# Patient Record
Sex: FEMALE | Race: BLACK OR AFRICAN AMERICAN | Employment: FULL TIME | URBAN - NONMETROPOLITAN AREA
[De-identification: names, ages, dates, MRNs, and addresses within clinical notes are randomized per-mention and may not be internally consistent; named-entity substitution may affect disease eponyms.]

---

## 2017-10-12 ENCOUNTER — HOSPITAL ENCOUNTER (EMERGENCY)
Age: 42
Discharge: HOME | End: 2017-10-12
Payer: COMMERCIAL

## 2017-10-12 VITALS
OXYGEN SATURATION: 99 % | DIASTOLIC BLOOD PRESSURE: 76 MMHG | RESPIRATION RATE: 16 BRPM | BODY MASS INDEX: 25.77 KG/M2 | WEIGHT: 180 LBS | TEMPERATURE: 98.4 F | HEIGHT: 70 IN | SYSTOLIC BLOOD PRESSURE: 117 MMHG | HEART RATE: 93 BPM

## 2017-10-12 DIAGNOSIS — Z88.9 HISTORY OF SEASONAL ALLERGIES: ICD-10-CM

## 2017-10-12 DIAGNOSIS — A08.4 VIRAL GASTROENTERITIS: Primary | ICD-10-CM

## 2017-10-12 PROCEDURE — 99213 OFFICE O/P EST LOW 20 MIN: CPT

## 2017-10-12 PROCEDURE — 99213 OFFICE O/P EST LOW 20 MIN: CPT | Performed by: NURSE PRACTITIONER

## 2017-10-12 RX ORDER — CETIRIZINE HYDROCHLORIDE 10 MG/1
10 TABLET ORAL DAILY
Qty: 30 TABLET | Refills: 0 | COMMUNITY
Start: 2017-10-12 | End: 2020-02-10 | Stop reason: ALTCHOICE

## 2017-10-12 RX ORDER — LABETALOL 100 MG/1
100 TABLET, FILM COATED ORAL 2 TIMES DAILY
COMMUNITY
End: 2020-02-10 | Stop reason: ALTCHOICE

## 2017-10-12 ASSESSMENT — ENCOUNTER SYMPTOMS
ABDOMINAL DISTENTION: 0
EYE REDNESS: 0
EYE DISCHARGE: 0
VOICE CHANGE: 0
BLOOD IN STOOL: 0
COUGH: 0
SORE THROAT: 0
SWOLLEN GLANDS: 0
VOMITING: 1
SINUS PRESSURE: 0
STRIDOR: 0
DIARRHEA: 1
ABDOMINAL PAIN: 0
NAUSEA: 1
TROUBLE SWALLOWING: 0
RHINORRHEA: 1
WHEEZING: 0

## 2017-10-12 ASSESSMENT — PAIN DESCRIPTION - PAIN TYPE: TYPE: ACUTE PAIN

## 2017-10-12 ASSESSMENT — PAIN SCALES - GENERAL: PAINLEVEL_OUTOF10: 7

## 2017-10-12 NOTE — ED PROVIDER NOTES
[facility] Fall [facility]  Urgent Care Encounter      CHIEF COMPLAINT       Chief Complaint   Patient presents with    Nausea    Generalized Body Aches       Nurses Notes reviewed and I agree except as noted in the HPI. HISTORY OF PRESENT ILLNESS   Juice Williamson is a 38 y.o. female who presents:  Also reports has been sneezing for a couple weeks has history of seasonal allergies. Has zyrtec at home has not taken it yet. No nasal nasal congestion, no cough, no fever, no sore throat. The history is provided by the patient. Illness    The current episode started yesterday. The onset was sudden. The problem occurs frequently. The problem has been unchanged. The problem is moderate. The symptoms are relieved by one or more OTC medications. Nothing aggravates the symptoms. Associated symptoms include diarrhea, nausea, vomiting, rhinorrhea and muscle aches. Pertinent negatives include no fever, no abdominal pain, no congestion, no ear pain, no headaches, no sore throat, no stridor, no swollen glands, no cough, no URI, no wheezing, no rash, no eye discharge and no eye redness. The rhinorrhea has been occurring rarely. The nasal discharge has a clear appearance. The vomiting occurs intermittently (yesterday). The vomiting is not associated with pain. Diarrhea timin times watery brown last 24 hours, last one last evening. She has been eating less than usual and drinking less than usual (ate waffles yesterday morning). Urine output has been normal. The last void occurred less than 6 hours ago. Sick contacts: sick kids  She has received no recent medical care. REVIEW OF SYSTEMS     Review of Systems   Constitutional: Positive for appetite change. Negative for activity change, chills, diaphoresis, fatigue, fever and unexpected weight change. HENT: Positive for rhinorrhea and sneezing.  Negative for congestion, ear pain, postnasal drip, sinus pressure, sore throat, trouble swallowing and voice oropharynx is clear and moist and mucous membranes are normal.   Neck: Normal range of motion. Neck supple. Cardiovascular: Normal rate, regular rhythm, S1 normal, S2 normal and normal heart sounds. No murmur heard. Pulmonary/Chest: Effort normal and breath sounds normal. No accessory muscle usage. No respiratory distress. She has no decreased breath sounds. She has no wheezes. She has no rhonchi. She has no rales. Abdominal: Soft. Bowel sounds are normal. She exhibits no distension. There is no tenderness. Lymphadenopathy:     She has no cervical adenopathy. Neurological: She is alert and oriented to person, place, and time. Skin: Skin is warm, dry and intact. No rash noted. She is not diaphoretic. No pallor. Psychiatric: She has a normal mood and affect. Nursing note and vitals reviewed. DIAGNOSTIC RESULTS   Labs:No results found for this visit on 10/12/17. IMAGING:    URGENT CARE COURSE:     Vitals:    10/12/17 0813   BP: 117/76   Pulse: 93   Resp: 16   Temp: 98.4 °F (36.9 °C)   SpO2: 99%   Weight: 180 lb (81.6 kg)   Height: 5' 10\" (1.778 m)       Medications - No data to display  PROCEDURES:  None  FINAL IMPRESSION      1. Viral gastroenteritis    2. History of seasonal allergies        DISPOSITION/PLAN   DISPOSITION Decision to Discharge   Had 2 emesis yesterday none today, no diarrhea stools. No abdominal tenderness. Afebrile, nontoxic  In appearance. Offered Zofran states gives her a headache. These symptoms are consistent with viral gastroenteritis. I recommend Clear Liquids only next 12-24 hours. If tolerated then BRAT Diet .   Advise the patient that if worsening symptoms such as more than 6 stools per day, not voiding regularly, persistent vomiting not relieved with medication,unable to take oral fluids, high fever, severe weakness, lightheadedness or fainting, dry mucous membranes or other signs of dehydration, persisting or increasing abdominal pain, blood in stool or

## 2019-02-07 ENCOUNTER — HOSPITAL ENCOUNTER (OUTPATIENT)
Age: 44
Setting detail: SPECIMEN
Discharge: HOME | End: 2019-02-07
Payer: COMMERCIAL

## 2019-02-07 LAB
ABSOLUTE EOS #: 0.21 K/UL (ref 0–0.44)
ABSOLUTE IMMATURE GRANULOCYTE: <0.03 K/UL (ref 0–0.3)
ABSOLUTE LYMPH #: 2.38 K/UL (ref 1.1–3.7)
ABSOLUTE MONO #: 0.5 K/UL (ref 0.1–1.2)
ALBUMIN SERPL-MCNC: 4.9 G/DL (ref 3.5–5.2)
ALBUMIN/GLOBULIN RATIO: 2.5 (ref 1–2.5)
ALP BLD-CCNC: 69 U/L (ref 35–104)
ALT SERPL-CCNC: 14 U/L (ref 5–33)
ANION GAP SERPL CALCULATED.3IONS-SCNC: 15 MMOL/L (ref 9–17)
AST SERPL-CCNC: 17 U/L
BASOPHILS # BLD: 0 % (ref 0–2)
BASOPHILS ABSOLUTE: 0.03 K/UL (ref 0–0.2)
BILIRUB SERPL-MCNC: 1.43 MG/DL (ref 0.3–1.2)
BUN BLDV-MCNC: 12 MG/DL (ref 6–20)
BUN/CREAT BLD: ABNORMAL (ref 9–20)
CALCIUM SERPL-MCNC: 9.6 MG/DL (ref 8.6–10.4)
CHLORIDE BLD-SCNC: 103 MMOL/L (ref 98–107)
CHOLESTEROL/HDL RATIO: 2.3
CHOLESTEROL: 181 MG/DL
CO2: 23 MMOL/L (ref 20–31)
CREAT SERPL-MCNC: 0.64 MG/DL (ref 0.5–0.9)
DIFFERENTIAL TYPE: NORMAL
EOSINOPHILS RELATIVE PERCENT: 3 % (ref 1–4)
GFR AFRICAN AMERICAN: >60 ML/MIN
GFR NON-AFRICAN AMERICAN: >60 ML/MIN
GFR SERPL CREATININE-BSD FRML MDRD: ABNORMAL ML/MIN/{1.73_M2}
GFR SERPL CREATININE-BSD FRML MDRD: ABNORMAL ML/MIN/{1.73_M2}
GLUCOSE BLD-MCNC: 72 MG/DL (ref 70–99)
HCT VFR BLD CALC: 40 % (ref 36.3–47.1)
HDLC SERPL-MCNC: 80 MG/DL
HEMOGLOBIN: 13.1 G/DL (ref 11.9–15.1)
IMMATURE GRANULOCYTES: 0 %
LDL CHOLESTEROL: 90 MG/DL (ref 0–130)
LYMPHOCYTES # BLD: 35 % (ref 24–43)
MCH RBC QN AUTO: 29.2 PG (ref 25.2–33.5)
MCHC RBC AUTO-ENTMCNC: 32.8 G/DL (ref 28.4–34.8)
MCV RBC AUTO: 89.3 FL (ref 82.6–102.9)
MONOCYTES # BLD: 7 % (ref 3–12)
NRBC AUTOMATED: 0 PER 100 WBC
PDW BLD-RTO: 12.3 % (ref 11.8–14.4)
PLATELET # BLD: NORMAL K/UL (ref 138–453)
PLATELET ESTIMATE: NORMAL
PLATELET, FLUORESCENCE: 177 K/UL (ref 138–453)
PLATELET, IMMATURE FRACTION: 11.6 % (ref 1.1–10.3)
PMV BLD AUTO: NORMAL FL (ref 8.1–13.5)
POTASSIUM SERPL-SCNC: 4.1 MMOL/L (ref 3.7–5.3)
RBC # BLD: 4.48 M/UL (ref 3.95–5.11)
RBC # BLD: NORMAL 10*6/UL
SEG NEUTROPHILS: 54 % (ref 36–65)
SEGMENTED NEUTROPHILS ABSOLUTE COUNT: 3.6 K/UL (ref 1.5–8.1)
SODIUM BLD-SCNC: 141 MMOL/L (ref 135–144)
TOTAL PROTEIN: 6.9 G/DL (ref 6.4–8.3)
TRIGL SERPL-MCNC: 53 MG/DL
TSH SERPL DL<=0.05 MIU/L-ACNC: 1.16 MIU/L (ref 0.3–5)
VLDLC SERPL CALC-MCNC: NORMAL MG/DL (ref 1–30)
WBC # BLD: 6.7 K/UL (ref 3.5–11.3)
WBC # BLD: NORMAL 10*3/UL

## 2019-03-01 ENCOUNTER — HOSPITAL ENCOUNTER (OUTPATIENT)
Age: 44
Setting detail: SPECIMEN
Discharge: HOME | End: 2019-03-01
Payer: COMMERCIAL

## 2019-03-01 LAB — PLATELET # BLD: 189 K/UL (ref 138–453)

## 2020-02-10 ENCOUNTER — OFFICE VISIT (OUTPATIENT)
Dept: FAMILY MEDICINE CLINIC | Age: 45
End: 2020-02-10
Payer: COMMERCIAL

## 2020-02-10 VITALS
HEART RATE: 86 BPM | OXYGEN SATURATION: 98 % | SYSTOLIC BLOOD PRESSURE: 132 MMHG | HEIGHT: 70 IN | RESPIRATION RATE: 12 BRPM | BODY MASS INDEX: 24.97 KG/M2 | WEIGHT: 174.4 LBS | DIASTOLIC BLOOD PRESSURE: 86 MMHG | TEMPERATURE: 98.7 F

## 2020-02-10 PROCEDURE — 99203 OFFICE O/P NEW LOW 30 MIN: CPT | Performed by: FAMILY MEDICINE

## 2020-02-10 RX ORDER — AMLODIPINE BESYLATE 2.5 MG/1
2.5 TABLET ORAL DAILY
Qty: 90 TABLET | Refills: 3 | Status: SHIPPED | OUTPATIENT
Start: 2020-02-10 | End: 2021-02-15

## 2020-02-10 RX ORDER — AMLODIPINE BESYLATE 2.5 MG/1
TABLET ORAL DAILY
COMMUNITY
Start: 2019-12-04 | End: 2020-02-10 | Stop reason: SDUPTHER

## 2020-02-10 RX ORDER — AMOXICILLIN 875 MG/1
875 TABLET, COATED ORAL 2 TIMES DAILY
Qty: 20 TABLET | Refills: 0 | Status: SHIPPED | OUTPATIENT
Start: 2020-02-10 | End: 2020-02-20

## 2020-02-10 ASSESSMENT — ENCOUNTER SYMPTOMS
COUGH: 0
SINUS PRESSURE: 1
CONSTIPATION: 0
ABDOMINAL PAIN: 0
SHORTNESS OF BREATH: 0
DIARRHEA: 0
EYE PAIN: 1
SINUS PAIN: 1
NAUSEA: 0
BACK PAIN: 0
VOMITING: 0
SORE THROAT: 0
RHINORRHEA: 0

## 2020-02-10 ASSESSMENT — PATIENT HEALTH QUESTIONNAIRE - PHQ9
SUM OF ALL RESPONSES TO PHQ QUESTIONS 1-9: 0
2. FEELING DOWN, DEPRESSED OR HOPELESS: 0
1. LITTLE INTEREST OR PLEASURE IN DOING THINGS: 0
SUM OF ALL RESPONSES TO PHQ QUESTIONS 1-9: 0
SUM OF ALL RESPONSES TO PHQ9 QUESTIONS 1 & 2: 0

## 2020-02-10 NOTE — PROGRESS NOTES
History   Problem Relation Age of Onset    High Blood Pressure Mother     High Blood Pressure Father     Diabetes Father     Heart Surgery Father      Social History     Tobacco Use    Smoking status: Never Smoker    Smokeless tobacco: Never Used   Substance Use Topics    Alcohol use: No      Current Outpatient Medications   Medication Sig Dispense Refill    amLODIPine (NORVASC) 2.5 MG tablet daily       No current facility-administered medications for this visit. Allergies   Allergen Reactions    Adhesive Tape Other (See Comments)    Morphine Itching and Nausea And Vomiting    Sulfa Antibiotics Rash       Health Maintenance   Topic Date Due    DTaP/Tdap/Td vaccine (1 - Tdap) 04/11/1988    HIV screen  04/11/1992    Cervical cancer screen  04/11/1998    Flu vaccine (1) 09/01/2019    Lipid screen  02/07/2024    Shingles Vaccine (1 of 2) 04/11/2027    Hepatitis A vaccine  Aged Out    Hepatitis B vaccine  Aged Out    Hib vaccine  Aged Out    Meningococcal (ACWY) vaccine  Aged Out    Pneumococcal 0-64 years Vaccine  Aged Out       Subjective:      Review of Systems   Constitutional: Negative for chills and fever. HENT: Positive for sinus pressure and sinus pain. Negative for ear discharge, ear pain and sore throat. Eyes: Positive for pain. Negative for visual disturbance. Respiratory: Negative for cough and shortness of breath. Cardiovascular: Negative for chest pain and palpitations. Gastrointestinal: Negative for abdominal pain, constipation, diarrhea, nausea and vomiting. Genitourinary: Negative for difficulty urinating and dysuria. Musculoskeletal: Negative for arthralgias and back pain. Neurological: Positive for headaches. Negative for syncope. Psychiatric/Behavioral: Negative for dysphoric mood. The patient is not nervous/anxious.           Objective:     Vitals:    02/10/20 1003   BP: 132/86   Site: Left Upper Arm   Position: Sitting   Cuff Size: Medium Adult   Pulse: LABGLOM >60 02/07/2019    CALCIUM 9.6 02/07/2019       Imaging Results:    No results found. Assessment:     1. Hypertension    2. Sinus infection      Plan:     1. Continue amlodipine to control blood pressure. Educated patient on correctly taking blood pressure including seated position for at least 5 minutes, arm at heart level, refraining from caffeine or any coffee before hand and smoking as well. 2. Sinus infection: will start amoxicillin 10 days, 2x a day, to control infection. 3. Draw labs including CBC, CMP, TSH, and lipid panel    4. Will follow up within 1 month to evaluate blood pressure readings and go over lab work. Will make this a wellness visit to help out with work forms/requirements. 5. Recommended doing physical activity and taking multivitamin. No follow-ups on file. Orders Placed:  No orders of the defined types were placed in this encounter. Medications Prescribed:  No orders of the defined types were placed in this encounter. No future appointments. Patient given educational materials - see patient instructions. Discussed use, benefit, and sideeffects of prescribed medications. All patient questions answered. Pt voiced understanding. Reviewed health maintenance. Instructed to continue current medications, diet and exercise. Patient agreed with treatment plan. Follow up as directed. I was present for the key portions of the exam and history and confirmed all areas of the note with the patient, staff and the student.       Electronically signed by Lam Zimmer on 2/10/2020 at 10:14 AM

## 2020-02-10 NOTE — PROGRESS NOTES
2/10/2020    Rogelio Vaca (:  1975) is a 45 y.o. female, here for evaluation of the following medical concerns:    Htn:  Running high at home. 130-140 over 80-90. Taking amlodipine. Frontal sinus pressure going on for several weeks. 2 wks of increased sputum production. Review of Systems   Constitutional: Negative for chills and fever. HENT: Positive for sinus pressure. Negative for ear pain and rhinorrhea. Respiratory: Negative for cough and shortness of breath. Cardiovascular: Negative for chest pain and palpitations. Gastrointestinal: Negative for diarrhea, nausea and vomiting. Endocrine: Negative for cold intolerance and polyuria. Genitourinary: Negative for dysuria and frequency. Musculoskeletal: Negative for arthralgias and back pain. Skin: Negative for rash and wound. Neurological: Negative for weakness and numbness. Hematological: Negative for adenopathy. Does not bruise/bleed easily. Psychiatric/Behavioral: Negative for sleep disturbance. The patient is not nervous/anxious. Prior to Visit Medications    Medication Sig Taking?  Authorizing Provider   amLODIPine (NORVASC) 2.5 MG tablet daily Yes Historical Provider, MD   amoxicillin (AMOXIL) 875 MG tablet Take 1 tablet by mouth 2 times daily for 10 days Yes Rheba Shires, DO        Allergies   Allergen Reactions    Adhesive Tape Other (See Comments)    Morphine Itching and Nausea And Vomiting    Sulfa Antibiotics Rash       Past Medical History:   Diagnosis Date    Hypertension        Past Surgical History:   Procedure Laterality Date    APPENDECTOMY      Dr. Mio Burris Right 2014    HYSTERECTOMY  2012    LIPOMA RESECTION Left 13    POSTERIOR THORACIC MASS-Dr. Gaby Ballesteros    MYOMECTOMY  , , , 2006    OVARY REMOVAL      right-Dr. Ana Ohara     SMALL INTESTINE SURGERY      intestine stuck to abdominal wall Dr. Duarte Bryant 2015 and 07/2017       Social History     Socioeconomic History    Marital status: Single     Spouse name: Not on file    Number of children: Not on file    Years of education: Not on file    Highest education level: Not on file   Occupational History    Not on file   Social Needs    Financial resource strain: Not on file    Food insecurity:     Worry: Not on file     Inability: Not on file    Transportation needs:     Medical: Not on file     Non-medical: Not on file   Tobacco Use    Smoking status: Never Smoker    Smokeless tobacco: Never Used   Substance and Sexual Activity    Alcohol use: No    Drug use: No    Sexual activity: Not on file   Lifestyle    Physical activity:     Days per week: Not on file     Minutes per session: Not on file    Stress: Not on file   Relationships    Social connections:     Talks on phone: Not on file     Gets together: Not on file     Attends Anabaptism service: Not on file     Active member of club or organization: Not on file     Attends meetings of clubs or organizations: Not on file     Relationship status: Not on file    Intimate partner violence:     Fear of current or ex partner: Not on file     Emotionally abused: Not on file     Physically abused: Not on file     Forced sexual activity: Not on file   Other Topics Concern    Not on file   Social History Narrative    Not on file        Family History   Problem Relation Age of Onset    High Blood Pressure Mother     High Blood Pressure Father     Diabetes Father     Heart Surgery Father        Vitals:    02/10/20 1003   BP: 132/86   Site: Left Upper Arm   Position: Sitting   Cuff Size: Medium Adult   Pulse: 86   Resp: 12   Temp: 98.7 °F (37.1 °C)   TempSrc: Oral   SpO2: 98%   Weight: 174 lb 6.4 oz (79.1 kg)   Height: 5' 10\" (1.778 m)     Estimated body mass index is 25.02 kg/m² as calculated from the following:    Height as of this encounter: 5' 10\" (1.778 m).     Weight as of this encounter: 174 lb 6.4 oz (79.1 kg). Physical Exam  Constitutional:       Appearance: She is well-developed. HENT:      Head: Normocephalic and atraumatic. Right Ear: External ear normal.      Left Ear: External ear normal.      Nose: Nose normal.   Eyes:      Pupils: Pupils are equal, round, and reactive to light. Neck:      Musculoskeletal: Normal range of motion and neck supple. Cardiovascular:      Rate and Rhythm: Normal rate and regular rhythm. Heart sounds: Normal heart sounds. Pulmonary:      Effort: Pulmonary effort is normal.      Breath sounds: Normal breath sounds. Abdominal:      General: There is no distension. Palpations: Abdomen is soft. Tenderness: There is no abdominal tenderness. Musculoskeletal: Normal range of motion. Skin:     General: Skin is warm and dry. Neurological:      Mental Status: She is alert and oriented to person, place, and time. Psychiatric:         Behavior: Behavior normal.         Thought Content: Thought content normal.         ASSESSMENT/PLAN:  1. Essential hypertension  Monitor at home. Cont amlodipine 2.5mg daily. F/u 1 month with readings and make changes if necessary    2. Sinus infection: acute. Start amoxil 875mg bid x 10 days. Return in about 4 weeks (around 3/9/2020). An  electronic signature was used to authenticate this note.     --Nathaniel Ríos, DO on 2/10/2020 at 11:02 AM

## 2020-02-24 ENCOUNTER — NURSE ONLY (OUTPATIENT)
Dept: LAB | Age: 45
End: 2020-02-24

## 2020-02-24 LAB
ALBUMIN SERPL-MCNC: 5 G/DL (ref 3.5–5.1)
ALP BLD-CCNC: 78 U/L (ref 38–126)
ALT SERPL-CCNC: 13 U/L (ref 11–66)
ANION GAP SERPL CALCULATED.3IONS-SCNC: 11 MEQ/L (ref 8–16)
AST SERPL-CCNC: 18 U/L (ref 5–40)
BASOPHILS # BLD: 0.4 %
BASOPHILS ABSOLUTE: 0 THOU/MM3 (ref 0–0.1)
BILIRUB SERPL-MCNC: 0.9 MG/DL (ref 0.3–1.2)
BUN BLDV-MCNC: 11 MG/DL (ref 7–22)
CALCIUM SERPL-MCNC: 9.5 MG/DL (ref 8.5–10.5)
CHLORIDE BLD-SCNC: 104 MEQ/L (ref 98–111)
CHOLESTEROL, TOTAL: 156 MG/DL (ref 100–199)
CO2: 25 MEQ/L (ref 23–33)
CREAT SERPL-MCNC: 0.8 MG/DL (ref 0.4–1.2)
EOSINOPHIL # BLD: 2.8 %
EOSINOPHILS ABSOLUTE: 0.2 THOU/MM3 (ref 0–0.4)
ERYTHROCYTE [DISTWIDTH] IN BLOOD BY AUTOMATED COUNT: 12 % (ref 11.5–14.5)
ERYTHROCYTE [DISTWIDTH] IN BLOOD BY AUTOMATED COUNT: 39.9 FL (ref 35–45)
GFR SERPL CREATININE-BSD FRML MDRD: 78 ML/MIN/1.73M2
GLUCOSE BLD-MCNC: 92 MG/DL (ref 70–108)
HCT VFR BLD CALC: 42.4 % (ref 37–47)
HDLC SERPL-MCNC: 72 MG/DL
HEMOGLOBIN: 13.9 GM/DL (ref 12–16)
IMMATURE GRANS (ABS): 0.01 THOU/MM3 (ref 0–0.07)
IMMATURE GRANULOCYTES: 0.1 %
LDL CHOLESTEROL CALCULATED: 68 MG/DL
LYMPHOCYTES # BLD: 38.5 %
LYMPHOCYTES ABSOLUTE: 2.8 THOU/MM3 (ref 1–4.8)
MCH RBC QN AUTO: 30 PG (ref 26–33)
MCHC RBC AUTO-ENTMCNC: 32.8 GM/DL (ref 32.2–35.5)
MCV RBC AUTO: 91.4 FL (ref 81–99)
MONOCYTES # BLD: 6.9 %
MONOCYTES ABSOLUTE: 0.5 THOU/MM3 (ref 0.4–1.3)
NUCLEATED RED BLOOD CELLS: 0 /100 WBC
PLATELET # BLD: 224 THOU/MM3 (ref 130–400)
PMV BLD AUTO: 12.6 FL (ref 9.4–12.4)
POTASSIUM SERPL-SCNC: 4.3 MEQ/L (ref 3.5–5.2)
RBC # BLD: 4.64 MILL/MM3 (ref 4.2–5.4)
SEG NEUTROPHILS: 51.3 %
SEGMENTED NEUTROPHILS ABSOLUTE COUNT: 3.7 THOU/MM3 (ref 1.8–7.7)
SODIUM BLD-SCNC: 140 MEQ/L (ref 135–145)
TOTAL PROTEIN: 7.7 G/DL (ref 6.1–8)
TRIGL SERPL-MCNC: 80 MG/DL (ref 0–199)
TSH SERPL DL<=0.05 MIU/L-ACNC: 2.06 UIU/ML (ref 0.4–4.2)
WBC # BLD: 7.3 THOU/MM3 (ref 4.8–10.8)

## 2020-03-12 ENCOUNTER — OFFICE VISIT (OUTPATIENT)
Dept: FAMILY MEDICINE CLINIC | Age: 45
End: 2020-03-12
Payer: COMMERCIAL

## 2020-03-12 VITALS
WEIGHT: 174 LBS | HEART RATE: 78 BPM | OXYGEN SATURATION: 98 % | HEIGHT: 70 IN | DIASTOLIC BLOOD PRESSURE: 83 MMHG | BODY MASS INDEX: 24.91 KG/M2 | SYSTOLIC BLOOD PRESSURE: 120 MMHG

## 2020-03-12 PROCEDURE — 99396 PREV VISIT EST AGE 40-64: CPT | Performed by: FAMILY MEDICINE

## 2020-03-12 NOTE — PROGRESS NOTES
Family Medicine Clinic Visit    Vivi Williamson presents for   Chief Complaint   Patient presents with    Hypertension    Annual Exam       HPI: Patient has hypertension. Readings have been averaging in the 120s over 80s at home. Occasionally she has high diastolics but mostly controlled. She is taking amlodipine 2.5 mg p.o. daily. No other significant issues. She reports that she is doing well otherwise. ROS:  denies CP, SOB, N/V/D    Past Medical History:   Diagnosis Date    Hypertension      Allergies   Allergen Reactions    Adhesive Tape Other (See Comments)    Morphine Itching and Nausea And Vomiting    Sulfa Antibiotics Rash       Current Outpatient Medications   Medication Sig Dispense Refill    amLODIPine (NORVASC) 2.5 MG tablet Take 1 tablet by mouth daily 90 tablet 3     No current facility-administered medications for this visit. Physical exam:  Vitals:    03/12/20 0914   BP: 120/83   Pulse: 78   SpO2: 98%     Physical Exam  Constitutional:       Appearance: She is well-developed. HENT:      Head: Normocephalic and atraumatic. Right Ear: External ear normal.      Left Ear: External ear normal.      Nose: Nose normal.   Eyes:      Pupils: Pupils are equal, round, and reactive to light. Neck:      Musculoskeletal: Normal range of motion and neck supple. Cardiovascular:      Rate and Rhythm: Normal rate and regular rhythm. Heart sounds: Normal heart sounds. Pulmonary:      Effort: Pulmonary effort is normal.      Breath sounds: Normal breath sounds. Abdominal:      General: There is no distension. Palpations: Abdomen is soft. Tenderness: There is no abdominal tenderness. Musculoskeletal: Normal range of motion. Skin:     General: Skin is warm and dry. Neurological:      Mental Status: She is alert and oriented to person, place, and time. Psychiatric:         Behavior: Behavior normal.         Thought Content:  Thought content normal.

## 2020-06-26 ENCOUNTER — TELEPHONE (OUTPATIENT)
Dept: FAMILY MEDICINE CLINIC | Age: 45
End: 2020-06-26

## 2020-06-26 NOTE — TELEPHONE ENCOUNTER
After speaking with Dr. Kaden Barrera advised patient to complete e-visit. Patient voiced understanding and denied any other questions or concerns at this time.

## 2020-07-09 ENCOUNTER — OFFICE VISIT (OUTPATIENT)
Dept: FAMILY MEDICINE CLINIC | Age: 45
End: 2020-07-09
Payer: COMMERCIAL

## 2020-07-09 VITALS
OXYGEN SATURATION: 98 % | DIASTOLIC BLOOD PRESSURE: 88 MMHG | BODY MASS INDEX: 27.16 KG/M2 | HEIGHT: 69 IN | SYSTOLIC BLOOD PRESSURE: 124 MMHG | WEIGHT: 183.4 LBS | TEMPERATURE: 97.3 F | HEART RATE: 90 BPM

## 2020-07-09 PROCEDURE — 99214 OFFICE O/P EST MOD 30 MIN: CPT | Performed by: FAMILY MEDICINE

## 2020-07-09 RX ORDER — AMOXICILLIN 875 MG/1
875 TABLET, COATED ORAL 2 TIMES DAILY
Qty: 20 TABLET | Refills: 0 | Status: SHIPPED | OUTPATIENT
Start: 2020-07-09 | End: 2020-07-19

## 2020-07-09 SDOH — ECONOMIC STABILITY: TRANSPORTATION INSECURITY
IN THE PAST 12 MONTHS, HAS THE LACK OF TRANSPORTATION KEPT YOU FROM MEDICAL APPOINTMENTS OR FROM GETTING MEDICATIONS?: NO

## 2020-07-09 SDOH — ECONOMIC STABILITY: TRANSPORTATION INSECURITY
IN THE PAST 12 MONTHS, HAS LACK OF TRANSPORTATION KEPT YOU FROM MEETINGS, WORK, OR FROM GETTING THINGS NEEDED FOR DAILY LIVING?: NO

## 2020-07-09 SDOH — ECONOMIC STABILITY: FOOD INSECURITY: WITHIN THE PAST 12 MONTHS, THE FOOD YOU BOUGHT JUST DIDN'T LAST AND YOU DIDN'T HAVE MONEY TO GET MORE.: NEVER TRUE

## 2020-07-09 SDOH — ECONOMIC STABILITY: FOOD INSECURITY: WITHIN THE PAST 12 MONTHS, YOU WORRIED THAT YOUR FOOD WOULD RUN OUT BEFORE YOU GOT MONEY TO BUY MORE.: NEVER TRUE

## 2020-07-09 SDOH — ECONOMIC STABILITY: INCOME INSECURITY: HOW HARD IS IT FOR YOU TO PAY FOR THE VERY BASICS LIKE FOOD, HOUSING, MEDICAL CARE, AND HEATING?: NOT HARD AT ALL

## 2020-07-09 NOTE — PROGRESS NOTES
Health Maintenance Due   Topic Date Due    HIV screen  04/11/1992ordered    Cervical cancer screen  04/11/199806/21/20DR. Davie James
days         Health Maintenance Due   Topic Date Due    HIV screen  04/11/1992    Cervical cancer screen  04/11/1998       Patient Instructions     Thank you   1. Thank you for trusting us with your healthcare needs. You may receive a survey regarding today's visit. It would help us out if you would take a few moments to provide your feedback. We value your input. 2. Please bring in ALL medications BOTTLES, including inhalers, herbal supplements, over the counter, prescribed & non-prescribed medicine. The office would like actual medication bottles and a list.   3. Please note our OFFICE POLICIES:   a. Prior to getting your labs drawn, please check with your insurance company for benefits and eligibility of lab services. Often, insurance companies cover certain tests for preventative visits only. It is patient's responsibility to see what is covered. b. We are unable to change a diagnosis after the test has been performed. c. Lab orders will not be re-printed. Please hold onto your original lab orders and take them to your lab to be completed. d. If you no show your scheduled appointment three times, you will be dismissed from this practice. e. Martene Gables must be completed 24 hours prior to your schedule appointment. 4. If the list below has been completed, PLEASE FAX RECORDS TO OUR OFFICE @ [phone]. Once the records have been received we will update your records at our office:  Health Maintenance Due   Topic Date Due    HIV screen  04/11/1992    Cervical cancer screen  04/11/1998               Return in about 6 months (around 1/9/2021) for chronic diease follow-up.       Nate Recinos

## 2020-07-09 NOTE — PATIENT INSTRUCTIONS
Thank you   1. Thank you for trusting us with your healthcare needs. You may receive a survey regarding today's visit. It would help us out if you would take a few moments to provide your feedback. We value your input. 2. Please bring in ALL medications BOTTLES, including inhalers, herbal supplements, over the counter, prescribed & non-prescribed medicine. The office would like actual medication bottles and a list.   3. Please note our OFFICE POLICIES:   a. Prior to getting your labs drawn, please check with your insurance company for benefits and eligibility of lab services. Often, insurance companies cover certain tests for preventative visits only. It is patient's responsibility to see what is covered. b. We are unable to change a diagnosis after the test has been performed. c. Lab orders will not be re-printed. Please hold onto your original lab orders and take them to your lab to be completed. d. If you no show your scheduled appointment three times, you will be dismissed from this practice. e. Brennan Blare must be completed 24 hours prior to your schedule appointment. 4. If the list below has been completed, PLEASE FAX RECORDS TO OUR OFFICE @ [phone].  Once the records have been received we will update your records at our office:  Health Maintenance Due   Topic Date Due    HIV screen  04/11/1992    Cervical cancer screen  04/11/1998

## 2020-07-10 ENCOUNTER — PATIENT MESSAGE (OUTPATIENT)
Dept: FAMILY MEDICINE CLINIC | Age: 45
End: 2020-07-10

## 2020-07-10 NOTE — LETTER
[address] [place] [address],[address] [facility] [address] [place] [id number]  Phone: [phone]  Fax: [phone]    Mukesh Dawn DO        July 10, 2020     Patient: Nick Lee   YOB: 1975   Date of Visit: 7/10/2020       To Whom it May Concern:    Sudhakar Blackwell is under my care. Please allow her to use a face shield instead of a face mask at work due due to medical concerns. If you have any questions or concerns, please don't hesitate to call.     Sincerely,         Mukesh Dawn DO

## 2020-07-14 ENCOUNTER — TELEPHONE (OUTPATIENT)
Dept: FAMILY MEDICINE CLINIC | Age: 45
End: 2020-07-14

## 2020-07-14 NOTE — TELEPHONE ENCOUNTER
Letter written with diagnosis code on letter per [facility] [employer]. Letter has been faxed.

## 2020-07-14 NOTE — TELEPHONE ENCOUNTER
Leonora HAWKINS at [facility] in [facility] called stating that patient has brought in a letter stating that she will need to wear a face shield during work. Leonora states that the letter needs to have a medical diagnosis on the letter. Which the letter will need faxed to [phone] within 3 days or patient will no longer be able to wear the face mask during work.

## 2020-11-16 ENCOUNTER — OFFICE VISIT (OUTPATIENT)
Dept: FAMILY MEDICINE CLINIC | Age: 45
End: 2020-11-16
Payer: COMMERCIAL

## 2020-11-16 VITALS
OXYGEN SATURATION: 99 % | HEIGHT: 69 IN | WEIGHT: 187.8 LBS | DIASTOLIC BLOOD PRESSURE: 90 MMHG | HEART RATE: 73 BPM | TEMPERATURE: 97 F | SYSTOLIC BLOOD PRESSURE: 126 MMHG | RESPIRATION RATE: 16 BRPM | BODY MASS INDEX: 27.81 KG/M2

## 2020-11-16 PROCEDURE — 99212 OFFICE O/P EST SF 10 MIN: CPT | Performed by: FAMILY MEDICINE

## 2020-11-16 RX ORDER — CETIRIZINE HYDROCHLORIDE 10 MG/1
10 TABLET ORAL DAILY PRN
COMMUNITY

## 2020-11-16 NOTE — PROGRESS NOTES
Family Medicine Clinic Visit    Toy Alexander presents for   Chief Complaint   Patient presents with    Mass     bumps in back of throat dentist noticed in September and suggested for patient to PCP       HPI: Patient's dentist noted some masses in the back of her throat on the right side and wanted to get them checked out. No issues including fevers chills. She does not know if they have been growing. No problems with swallowing. ROS:  denies CP, SOB, N/V/D    Past Medical History:   Diagnosis Date    Hypertension      Allergies   Allergen Reactions    Adhesive Tape Other (See Comments)     Blisters    Morphine Itching and Nausea And Vomiting     Headache    Sulfa Antibiotics Rash       Current Outpatient Medications   Medication Sig Dispense Refill    cetirizine (ZYRTEC) 10 MG tablet Take 10 mg by mouth daily      amLODIPine (NORVASC) 2.5 MG tablet Take 1 tablet by mouth daily 90 tablet 3    Vitamin Mixture (VITAMIN C) LIQD Take by mouth daily      Multiple Vitamin (MULTIVITAMINS PO) Take by mouth daily liquid       No current facility-administered medications for this visit. Physical exam:  Vitals:    11/16/20 1701   BP: (!) 126/90   Pulse:    Resp:    Temp:    SpO2:      2 small 4 to 5 mm fleshy nodules are noted appear to be extensions of the adenoids dripping behind the tonsillar pillar on the right side. Appear to be pink healthy tissue. Cecelia Butler was seen today for mass. Diagnoses and all orders for this visit:    Mass of throat-recheck at her next well visit in about 2 to 3 months. She declines going to the ENT. Health Maintenance Due   Topic Date Due    HIV screen  04/11/1992    Cervical cancer screen  04/11/1998    Flu vaccine (1) 09/01/2020       Patient Instructions     Thank you   1. Thank you for trusting us with your healthcare needs. You may receive a survey regarding today's visit.  It would help us out if you would take a few moments to provide your feedback. We value your input. 2. Please bring in ALL medications BOTTLES, including inhalers, herbal supplements, over the counter, prescribed & non-prescribed medicine. The office would like actual medication bottles and a list.   3. Please note our OFFICE POLICIES:   a. Prior to getting your labs drawn, please check with your insurance company for benefits and eligibility of lab services. Often, insurance companies cover certain tests for preventative visits only. It is patient's responsibility to see what is covered. b. We are unable to change a diagnosis after the test has been performed. c. Lab orders will not be re-printed. Please hold onto your original lab orders and take them to your lab to be completed. d. If you no show your scheduled appointment three times, you will be dismissed from this practice. e. Becerril Valarie must be completed 24 hours prior to your schedule appointment. 4. If the list below has been completed, PLEASE FAX RECORDS TO OUR OFFICE @ [phone]. Once the records have been received we will update your records at our office:  Health Maintenance Due   Topic Date Due    HIV screen  04/11/1992    Cervical cancer screen  04/11/1998    Flu vaccine (1) 09/01/2020               Return in about 3 months (around 2/16/2021).       Francine Haney

## 2020-11-16 NOTE — PATIENT INSTRUCTIONS
Thank you   1. Thank you for trusting us with your healthcare needs. You may receive a survey regarding today's visit. It would help us out if you would take a few moments to provide your feedback. We value your input. 2. Please bring in ALL medications BOTTLES, including inhalers, herbal supplements, over the counter, prescribed & non-prescribed medicine. The office would like actual medication bottles and a list.   3. Please note our OFFICE POLICIES:   a. Prior to getting your labs drawn, please check with your insurance company for benefits and eligibility of lab services. Often, insurance companies cover certain tests for preventative visits only. It is patient's responsibility to see what is covered. b. We are unable to change a diagnosis after the test has been performed. c. Lab orders will not be re-printed. Please hold onto your original lab orders and take them to your lab to be completed. d. If you no show your scheduled appointment three times, you will be dismissed from this practice. e. Elvira Pink must be completed 24 hours prior to your schedule appointment. 4. If the list below has been completed, PLEASE FAX RECORDS TO OUR OFFICE @ [phone].  Once the records have been received we will update your records at our office:  Health Maintenance Due   Topic Date Due    HIV screen  04/11/1992    Cervical cancer screen  04/11/1998    Flu vaccine (1) 09/01/2020

## 2021-02-15 RX ORDER — AMLODIPINE BESYLATE 2.5 MG/1
TABLET ORAL
Qty: 90 TABLET | Refills: 3 | Status: SHIPPED | OUTPATIENT
Start: 2021-02-15 | End: 2022-04-19

## 2021-02-15 NOTE — TELEPHONE ENCOUNTER
Last visit- 11/16/2020  Next visit- 2/22/2021 with Dr. Gretel Mcqueen    Requested Prescriptions     Pending Prescriptions Disp Refills    amLODIPine (NORVASC) 2.5 MG tablet [Pharmacy Med Name: AMLODIPINE BESYLATE 2.5 MG TAB] 90 tablet 3     Sig: TAKE 1 Lysle Yelena       Dr. Charlie Eckert is out of the office, sending to Dr. Aminta Celestin whom is covering    Please review, approve or deny

## 2021-02-26 ENCOUNTER — TELEPHONE (OUTPATIENT)
Dept: FAMILY MEDICINE CLINIC | Age: 46
End: 2021-02-26

## 2021-02-26 NOTE — TELEPHONE ENCOUNTER
Angelica Marixa called in regarding a VM she received about a No Show appt on 2.22.2021. Pt states when the automated call came through to confirm her appt, she chose to cancel. Auto system confirmed the appt was cancelled and someone from the office would reach out to r/s with her.       [phone]

## 2021-03-01 NOTE — TELEPHONE ENCOUNTER
Spoke with pt, pt states she is dealing with her father and all his doctors appointments and that she will call the office to schedule after she gets her father taken care of. Is there anyway to fix her \"NO SHOW\" to a cancelled on 2/22/2021?

## 2021-04-05 ENCOUNTER — NURSE ONLY (OUTPATIENT)
Dept: LAB | Age: 46
End: 2021-04-05

## 2021-04-05 DIAGNOSIS — Z00.00 ANNUAL PHYSICAL EXAM: ICD-10-CM

## 2021-04-05 DIAGNOSIS — Z11.4 SCREENING FOR HIV (HUMAN IMMUNODEFICIENCY VIRUS): ICD-10-CM

## 2021-04-05 LAB
ALBUMIN SERPL-MCNC: 5.2 G/DL (ref 3.5–5.1)
ALP BLD-CCNC: 76 U/L (ref 38–126)
ALT SERPL-CCNC: 27 U/L (ref 11–66)
ANION GAP SERPL CALCULATED.3IONS-SCNC: 10 MEQ/L (ref 8–16)
AST SERPL-CCNC: 27 U/L (ref 5–40)
BILIRUB SERPL-MCNC: 1.2 MG/DL (ref 0.3–1.2)
BUN BLDV-MCNC: 13 MG/DL (ref 7–22)
CALCIUM SERPL-MCNC: 9.7 MG/DL (ref 8.5–10.5)
CHLORIDE BLD-SCNC: 107 MEQ/L (ref 98–111)
CHOLESTEROL, TOTAL: 185 MG/DL (ref 100–199)
CO2: 28 MEQ/L (ref 23–33)
CREAT SERPL-MCNC: 0.7 MG/DL (ref 0.4–1.2)
GFR SERPL CREATININE-BSD FRML MDRD: > 90 ML/MIN/1.73M2
GLUCOSE BLD-MCNC: 92 MG/DL (ref 70–108)
HDLC SERPL-MCNC: 90 MG/DL
LDL CHOLESTEROL CALCULATED: 83 MG/DL
POTASSIUM SERPL-SCNC: 4.1 MEQ/L (ref 3.5–5.2)
SODIUM BLD-SCNC: 145 MEQ/L (ref 135–145)
TOTAL PROTEIN: 7.9 G/DL (ref 6.1–8)
TRIGL SERPL-MCNC: 62 MG/DL (ref 0–199)

## 2021-04-06 LAB — HIV AG/AB: NONREACTIVE

## 2021-04-19 ENCOUNTER — TELEPHONE (OUTPATIENT)
Dept: FAMILY MEDICINE CLINIC | Age: 46
End: 2021-04-19

## 2021-04-19 ENCOUNTER — OFFICE VISIT (OUTPATIENT)
Dept: FAMILY MEDICINE CLINIC | Age: 46
End: 2021-04-19
Payer: COMMERCIAL

## 2021-04-19 VITALS
HEART RATE: 81 BPM | DIASTOLIC BLOOD PRESSURE: 64 MMHG | TEMPERATURE: 98.6 F | BODY MASS INDEX: 27.17 KG/M2 | WEIGHT: 184 LBS | OXYGEN SATURATION: 98 % | SYSTOLIC BLOOD PRESSURE: 118 MMHG | RESPIRATION RATE: 16 BRPM

## 2021-04-19 DIAGNOSIS — Z12.31 ENCOUNTER FOR SCREENING MAMMOGRAM FOR MALIGNANT NEOPLASM OF BREAST: ICD-10-CM

## 2021-04-19 DIAGNOSIS — I10 ESSENTIAL HYPERTENSION: ICD-10-CM

## 2021-04-19 DIAGNOSIS — Z00.00 ANNUAL PHYSICAL EXAM: Primary | ICD-10-CM

## 2021-04-19 PROCEDURE — 99396 PREV VISIT EST AGE 40-64: CPT | Performed by: FAMILY MEDICINE

## 2021-04-19 RX ORDER — IBUPROFEN 800 MG/1
TABLET ORAL
COMMUNITY
Start: 2021-04-14 | End: 2021-09-01

## 2021-04-19 ASSESSMENT — PATIENT HEALTH QUESTIONNAIRE - PHQ9
SUM OF ALL RESPONSES TO PHQ QUESTIONS 1-9: 1
SUM OF ALL RESPONSES TO PHQ QUESTIONS 1-9: 1

## 2021-04-19 NOTE — PROGRESS NOTES
Health Maintenance Due   Topic Date Due    Hepatitis C screen  Never done    COVID-19 Vaccine (1) Never done    Cervical cancer screen  Never done 2020 - Annette Corea
place, and time. Psychiatric:         Behavior: Behavior normal.         Thought Content: Thought content normal.       Vitals:    04/19/21 1405   BP: 118/64   Pulse: 81   Resp: 16   Temp: 98.6 °F (37 °C)   SpO2: 98%           An electronic signature was used to authenticate this note.     --Josh Puri, DO

## 2021-04-19 NOTE — TELEPHONE ENCOUNTER
Health Maintenance Due   Topic Date Due    Hepatitis C screen  Never done    COVID-19 Vaccine (1) Never done    Cervical cancer screen  Never done 2020 - Annette Desire Lonell Sham       Please call for pap results.  [phone]

## 2021-09-01 ENCOUNTER — OFFICE VISIT (OUTPATIENT)
Dept: FAMILY MEDICINE CLINIC | Age: 46
End: 2021-09-01
Payer: COMMERCIAL

## 2021-09-01 VITALS
RESPIRATION RATE: 16 BRPM | SYSTOLIC BLOOD PRESSURE: 120 MMHG | DIASTOLIC BLOOD PRESSURE: 84 MMHG | OXYGEN SATURATION: 98 % | HEART RATE: 89 BPM | WEIGHT: 194.4 LBS | HEIGHT: 69 IN | BODY MASS INDEX: 28.79 KG/M2

## 2021-09-01 DIAGNOSIS — S93.412D SPRAIN OF CALCANEOFIBULAR LIGAMENT OF LEFT ANKLE, SUBSEQUENT ENCOUNTER: Primary | ICD-10-CM

## 2021-09-01 DIAGNOSIS — R26.89 UNABLE TO BEAR WEIGHT ON LEFT LOWER EXTREMITY: ICD-10-CM

## 2021-09-01 DIAGNOSIS — M62.838 MUSCLE SPASM: ICD-10-CM

## 2021-09-01 PROBLEM — I10 ESSENTIAL HYPERTENSION: Status: ACTIVE | Noted: 2021-09-01

## 2021-09-01 PROCEDURE — 99213 OFFICE O/P EST LOW 20 MIN: CPT | Performed by: STUDENT IN AN ORGANIZED HEALTH CARE EDUCATION/TRAINING PROGRAM

## 2021-09-01 RX ORDER — CYCLOBENZAPRINE HCL 10 MG
10 TABLET ORAL 3 TIMES DAILY PRN
Qty: 30 TABLET | Refills: 0 | Status: SHIPPED | OUTPATIENT
Start: 2021-09-01 | End: 2021-09-13 | Stop reason: SDUPTHER

## 2021-09-01 RX ORDER — OXYCODONE HYDROCHLORIDE 5 MG/1
5 CAPSULE ORAL EVERY 4 HOURS PRN
COMMUNITY
End: 2021-09-23 | Stop reason: ALTCHOICE

## 2021-09-01 SDOH — ECONOMIC STABILITY: FOOD INSECURITY: WITHIN THE PAST 12 MONTHS, YOU WORRIED THAT YOUR FOOD WOULD RUN OUT BEFORE YOU GOT MONEY TO BUY MORE.: PATIENT DECLINED

## 2021-09-01 SDOH — ECONOMIC STABILITY: FOOD INSECURITY: WITHIN THE PAST 12 MONTHS, THE FOOD YOU BOUGHT JUST DIDN'T LAST AND YOU DIDN'T HAVE MONEY TO GET MORE.: PATIENT DECLINED

## 2021-09-01 ASSESSMENT — SOCIAL DETERMINANTS OF HEALTH (SDOH): HOW HARD IS IT FOR YOU TO PAY FOR THE VERY BASICS LIKE FOOD, HOUSING, MEDICAL CARE, AND HEATING?: PATIENT DECLINED

## 2021-09-01 NOTE — PROGRESS NOTES
Health Maintenance Due   Topic Date Due    Hepatitis C screen  Never done    Cervical cancer screen  Never done    Flu vaccine (1) 09/01/2021

## 2021-09-01 NOTE — PROGRESS NOTES
02/07/2019     Lab Results   Component Value Date    HDL 90 04/05/2021    HDL 72 02/24/2020    HDL 80 02/07/2019     Lab Results   Component Value Date    LDLCHOLESTEROL 90 02/07/2019    LDLCALC 83 04/05/2021    LDLCALC 68 02/24/2020       No results found for: Orene Alpers    Review of Systems    Physical Exam  Vitals and nursing note reviewed. Constitutional:       General: She is not in acute distress (in pain, sitting to right side, in wheel chair). Appearance: She is well-developed. She is not diaphoretic. HENT:      Head: Normocephalic and atraumatic. Right Ear: External ear normal.      Left Ear: External ear normal.      Nose: Nose normal.   Eyes:      General: No scleral icterus. Right eye: No discharge. Left eye: No discharge. Conjunctiva/sclera: Conjunctivae normal.   Cardiovascular:      Pulses: Normal pulses. Pulmonary:      Effort: Pulmonary effort is normal.   Musculoskeletal:         General: Swelling (mild left ankle) and tenderness (inferior to lateral maleolus, tender over medial tibial plateau, tender over proximal fibula ) present. No deformity. Cervical back: Normal range of motion. Skin:     General: Skin is warm and dry. Findings: No erythema or rash. Neurological:      Mental Status: She is alert and oriented to person, place, and time. Psychiatric:         Behavior: Behavior normal.         Thought Content:  Thought content normal.         Judgment: Judgment normal.         Immunization History   Administered Date(s) Administered    COVID-19, J&J, PF, 0.5 mL 03/31/2021    Hepatitis A Ped/Adol (Havrix, Vaqta) 08/08/2016, 02/27/2017    Hepatitis B 08/08/2016, 09/07/2016, 02/27/2017    Influenza, High Dose (Fluzone 65 yrs and older) 11/01/2017    Tdap (Boostrix, Adacel) 08/08/2016       Health Maintenance Due   Topic Date Due    Hepatitis C screen  Never done    Cervical cancer screen  Never done    Flu vaccine (1) 09/01/2021 Food Insecurity: Unknown    Worried About Running Out of Food in the Last Year: Patient refused    Ran Out of Food in the Last Year: Patient refused       Assessment / Plan:      Diagnosis Orders   1. Sprain of calcaneofibular ligament of left ankle, subsequent encounter  XR TIBIA FIBULA LEFT (2 VIEWS)    [facility] -  [facility]    XR ANKLE LEFT (MIN 3 VIEWS)   2. Muscle spasm  cyclobenzaprine (FLEXERIL) 10 MG tablet   3. Unable to bear weight on left lower extremity  XR ANKLE LEFT (MIN 3 VIEWS)    XR KNEE LEFT (3 VIEWS)     Recheck XR ankle, check tib-fib xray due to proximal fibula pain. Check XR knee due to inability to bear weight and pain over medial tibial plateau. Exercises for at home. Refer to PT. Flexeril 10 mg TID PRN   Okay for NSAIDs with food for pain   Discontinue walking boot, bear weight as tolerated, ambulate with crutches as needed   RICE   Follow up in 4-6 weeks, consider OMT if continued pain for potential fibular head dysfunction if xrays normal        Return in about 6 weeks (around 10/13/2021) for ankle pain . Medications Prescribed:  Orders Placed This Encounter   Medications    cyclobenzaprine (FLEXERIL) 10 MG tablet     Sig: Take 1 tablet by mouth 3 times daily as needed for Muscle spasms     Dispense:  30 tablet     Refill:  0       No future appointments. Patient given educational materials - see patient instructions. Discussed use, benefit, and sideeffects of prescribed medications. All patient questions answered. Pt voiced understanding. Reviewed health maintenance. Instructed to continue current medications, diet and exercise. Patient agreed with treatment plan. Follow up as directed.      Electronically signed by Justo Kelly DO on 9/1/2021 at 4:08 PM

## 2021-09-01 NOTE — PATIENT INSTRUCTIONS
Patient Education        Ankle Sprain: Care Instructions  Your Care Instructions     An ankle sprain can happen when you twist your ankle. The ligaments that support the ankle can get stretched and torn. Often the ankle is swollen and painful. Ankle sprains may take from several weeks to several months to heal. Usually, the more pain and swelling you have, the more severe your ankle sprain is and the longer it will take to heal. You can heal faster and regain strength in your ankle with good home treatment. It is very important to give your ankle time to heal completely, so that you do not easily hurt your ankle again. Follow-up care is a key part of your treatment and safety. Be sure to make and go to all appointments, and call your doctor if you are having problems. It's also a good idea to know your test results and keep a list of the medicines you take. How can you care for yourself at home? · Prop up your foot on pillows as much as possible for the next 3 days. Try to keep your ankle above the level of your heart. This will help reduce the swelling. · Follow your doctor's directions for wearing a splint or elastic bandage. Wrapping the ankle may help reduce or prevent swelling. · Your doctor may give you a splint, a brace, an air stirrup, or another form of ankle support to protect your ankle until it is healed. Wear it as directed while your ankle is healing. Do not remove it unless your doctor tells you to. After your ankle has healed, ask your doctor whether you should wear the brace when you exercise. · Put ice or cold packs on your injured ankle for 10 to 20 minutes at a time. Try to do this every 1 to 2 hours for the next 3 days (when you are awake) or until the swelling goes down. Put a thin cloth between the ice and your skin. · You may need to use crutches until you can walk without pain. If you do use crutches, try to bear some weight on your injured ankle if you can do so without pain.  This helps the ankle heal.  · Take pain medicines exactly as directed. ? If the doctor gave you a prescription medicine for pain, take it as prescribed. ? If you are not taking a prescription pain medicine, ask your doctor if you can take an over-the-counter medicine. · If you have been given ankle exercises to do at home, do them exactly as instructed. These can promote healing and help prevent lasting weakness. When should you call for help? Call your doctor now or seek immediate medical care if:    · Your pain is getting worse.     · Your swelling is getting worse.     · Your splint feels too tight or you are unable to loosen it. Watch closely for changes in your health, and be sure to contact your doctor if:    · You are not getting better after 1 week. Where can you learn more? Go to https://LawnStarter.TakeLessons. org and sign in to your [employer] account. Enter D233 in the [employer]OLOG[employer] box to learn more about \"Ankle Sprain: Care Instructions. \"     If you do not have an account, please click on the \"Sign Up Now\" link. Current as of: November 16, 2020               Content Version: 12.9  © 2006-2021 [employer]. Care instructions adapted under license by [facility] [address] If you have questions about a medical condition or this instruction, always ask your healthcare professional. [address] any warranty or liability for your use of this information. Patient Education        Ankle Sprain: Rehab Exercises  Introduction  Here are some examples of exercises for you to try. The exercises may be suggested for a condition or for rehabilitation. Start each exercise slowly. Ease off the exercises if you start to have pain. You will be told when to start these exercises and which ones will work best for you. How to do the exercises  'Alphabet' exercise   1. Trace the alphabet with your toe. This helps your ankle move in all directions.     Side-to-side knee swing exercise   1. Sit in a chair with your foot flat on the floor. 2. Slowly move your knee from side to side. Keep your foot pressed flat. 3. Continue this exercise for 2 to 3 minutes. Towel curl   1. While sitting, place your foot on a towel on the floor. Scrunch the towel toward you with your toes. 2. Then use your toes to push the towel away from you. 3. To make this exercise more challenging you can put something on the other end of the towel. A can of soup is about the right weight for this. Towel stretch   1. Sit with your legs extended and knees straight. 2. Place a towel around your foot just under the toes. 3. Hold each end of the towel in each hand, with your hands above your knees. 4. Pull back with the towel so that your foot stretches toward you. 5. Hold the position for at least 15 to 30 seconds. 6. Repeat 2 to 4 times a session. Do up to 5 sessions a day. Ankle eversion exercise   1. Start by sitting with your foot flat on the floor. Push your foot outward against a wall or a piece of furniture that doesn't move. Hold for about 6 seconds, and relax. Repeat 8 to 12 times. 2. After you feel comfortable with this, try using rubber tubing looped around the outside of your feet for resistance. Push your foot out to the side against the tubing, and then count to 10 as you slowly bring your foot back to the middle. Repeat 8 to 12 times. Isometric opposition exercises   1. While sitting, put your feet together flat on the floor. 2. Press your injured foot inward against your other foot. Hold for about 6 seconds, and relax. Repeat 8 to 12 times. 3. Then place the heel of your other foot on top of the injured one. Push down with the top heel while trying to push up with your injured foot. Hold for about 6 seconds, and relax. Repeat 8 to 12 times. Resisted ankle inversion   1. Sit on the floor with your good leg crossed over your other leg.   2. Hold both ends of an exercise band and loop the band around the inside of your affected foot. Then press your other foot against the band. 3. Keeping your legs crossed, slowly push your affected foot against the band so that foot moves away from your other foot. Then slowly relax. 4. Repeat 8 to 12 times. Resisted ankle eversion   1. Sit on the floor with your legs straight. 2. Hold both ends of an exercise band and loop the band around the outside of your affected foot. Then press your other foot against the band. 3. Keeping your leg straight, slowly push your affected foot outward against the band and away from your other foot without letting your leg rotate. Then slowly relax. 4. Repeat 8 to 12 times. Resisted ankle dorsiflexion   1. Tie the ends of an exercise band together to form a loop. Attach one end of the loop to a secure object or shut a door on it to hold it in place. (Or you can have someone hold one end of the loop to provide resistance.)  2. While sitting on the floor or in a chair, loop the other end of the band over the top of your affected foot. 3. Keeping your knee and leg straight, slowly flex your foot to pull back on the exercise band, and then slowly relax. 4. Repeat 8 to 12 times. Single-leg balance   1. Stand on a flat surface with your arms stretched out to your sides like you are making the letter \"T. \" Then lift your good leg off the floor, bending it at the knee. If you are not steady on your feet, use one hand to hold on to a chair, counter, or wall. 2. Standing on the leg with your affected ankle, keep that knee straight. Try to balance on that leg for up to 30 seconds. Then rest for up to 10 seconds. 3. Repeat 6 to 8 times. 4. When you can balance on your affected leg for 30 seconds with your eyes open, try to balance on it with your eyes closed.   5. When you can do this exercise with your eyes closed for 30 seconds and with ease and no pain, try standing on a pillow or piece of foam, and repeat steps 1 through 4. Follow-up care is a key part of your treatment and safety. Be sure to make and go to all appointments, and call your doctor if you are having problems. It's also a good idea to know your test results and keep a list of the medicines you take. Where can you learn more? Go to https://chpepiceweb.ImmusanT. org and sign in to your [employer] account. Enter Shreyas Abbasi in the Bluefly box to learn more about \"Ankle Sprain: Rehab Exercises. \"     If you do not have an account, please click on the \"Sign Up Now\" link. Current as of: November 16, 2020               Content Version: 12.9  © 2006-2021 [employer]. Care instructions adapted under license by [facility] ([facility]). If you have questions about a medical condition or this instruction, always ask your healthcare professional. [address] any warranty or liability for your use of this information.

## 2021-09-01 NOTE — PROGRESS NOTES
S: 42 y.o. female with   Chief Complaint   Patient presents with    ED Follow-up     [place] airport [place] - left leg, left side of body painful       HPI: please see resident note for HPI and ROS. BP Readings from Last 3 Encounters:   09/01/21 120/84   04/19/21 118/64   11/16/20 (!) 126/90     Wt Readings from Last 3 Encounters:   09/01/21 194 lb 6.4 oz (88.2 kg)   04/19/21 184 lb (83.5 kg)   11/16/20 187 lb 12.8 oz (85.2 kg)       O: VS:  height is 5' 9\" (1.753 m) and weight is 194 lb 6.4 oz (88.2 kg). Her blood pressure is 120/84 and her pulse is 89. Her respiration is 16 and oxygen saturation is 98%. Diagnosis Orders   1. Sprain of calcaneofibular ligament of left ankle, subsequent encounter  XR TIBIA FIBULA LEFT (2 VIEWS)    [facility] - [facility]   2. Muscle spasm  cyclobenzaprine (FLEXERIL) 10 MG tablet       Plan:  Xray tib fib ordered. Refer to PT. Start prn flexeril. Repeat ankle xray since she still can't bear weight. Knee xrays as well. Health Maintenance Due   Topic Date Due    Hepatitis C screen  Never done    Cervical cancer screen  Never done    Flu vaccine (1) 09/01/2021       Attending Physician Statement  I have discussed the case, including pertinent history and exam findings with the resident. I agree with the documented assessment and plan as documented by the resident.         Lubna Villafana DO 9/1/2021 3:53 PM

## 2021-09-02 ENCOUNTER — TELEPHONE (OUTPATIENT)
Dept: FAMILY MEDICINE CLINIC | Age: 46
End: 2021-09-02

## 2021-09-02 ENCOUNTER — HOSPITAL ENCOUNTER (OUTPATIENT)
Dept: GENERAL RADIOLOGY | Age: 46
Discharge: HOME | End: 2021-09-02
Payer: COMMERCIAL

## 2021-09-02 ENCOUNTER — HOSPITAL ENCOUNTER (OUTPATIENT)
Age: 46
Discharge: HOME | End: 2021-09-02
Payer: COMMERCIAL

## 2021-09-02 DIAGNOSIS — S93.412A SPRAIN OF CALCANEOFIBULAR LIGAMENT OF LEFT ANKLE, INITIAL ENCOUNTER: Primary | ICD-10-CM

## 2021-09-02 DIAGNOSIS — S93.412D SPRAIN OF CALCANEOFIBULAR LIGAMENT OF LEFT ANKLE, SUBSEQUENT ENCOUNTER: ICD-10-CM

## 2021-09-02 PROCEDURE — 73590 X-RAY EXAM OF LOWER LEG: CPT

## 2021-09-02 NOTE — TELEPHONE ENCOUNTER
Spoke with Rosy Rahman at [address] [place] [address], their next available appointment is 9/9/21.

## 2021-09-02 NOTE — TELEPHONE ENCOUNTER
[facility] in [facility] [address].[place], [place]. [phone]. Can we call to see how long of a wait?

## 2021-09-02 NOTE — TELEPHONE ENCOUNTER
Pt informed and verbalized understanding. PT called her yesterday and told her they were booked out until 9/20/21. She is wondering if there is some other PT in [facility] she could get a referral to? Please advise.

## 2021-09-02 NOTE — TELEPHONE ENCOUNTER
Patient called the office, states injury 8/31/21. Patient would like to know when she may return to work, she will be dropping off forms for completion. Patient states she was able to get scheduled for physical therapy 9/20/21, wants therapy before returning to work. Please advise approximate return to work date.

## 2021-09-02 NOTE — TELEPHONE ENCOUNTER
Thank you so much! Please call and let the patient know that I have referred her to  Physical Therapy and that the soonest would be 9/9/21.

## 2021-09-02 NOTE — TELEPHONE ENCOUNTER
Estimate would be 4-6 weeks, earliest 9/28/2021. Unless able to tolerate full weight bearing sooner .

## 2021-09-08 ENCOUNTER — TELEPHONE (OUTPATIENT)
Dept: FAMILY MEDICINE CLINIC | Age: 46
End: 2021-09-08

## 2021-09-08 NOTE — TELEPHONE ENCOUNTER
Patient dropped off Jose Jiang paperwork to be completed. Form placed in Dr. Murphy Addison box to complete. Please call patient with any questions/concerns or when the form is complete.

## 2021-09-13 DIAGNOSIS — M62.838 MUSCLE SPASM: ICD-10-CM

## 2021-09-13 RX ORDER — CYCLOBENZAPRINE HCL 10 MG
10 TABLET ORAL 3 TIMES DAILY PRN
Qty: 30 TABLET | Refills: 2 | Status: SHIPPED | OUTPATIENT
Start: 2021-09-13 | End: 2021-09-23

## 2021-09-13 NOTE — TELEPHONE ENCOUNTER
Patient's last appointment was : 9/1/2021  Patient's next appointment is : Visit date not found  Last refilled: 9/1/21

## 2021-09-22 ENCOUNTER — HOSPITAL ENCOUNTER (OUTPATIENT)
Dept: PHYSICAL THERAPY | Age: 46
Setting detail: THERAPIES SERIES
Discharge: HOME | End: 2021-09-22
Payer: COMMERCIAL

## 2021-09-22 PROCEDURE — 97162 PT EVAL MOD COMPLEX 30 MIN: CPT

## 2021-09-22 NOTE — PROGRESS NOTES
** PLEASE PLEASE SIGN, DATE AND TIME CERTIFICATION BELOW AND RETURN TO [facility] OUTPATIENT REHABILITATION (FAX #: [phone]). ATTEST/CO-SIGN IF ACCESSING VIA INBackplane. THANK YOU.**    I certify that I have examined the patient below and determined that Physical Medicine and Rehabilitation service is necessary and that I approve the established plan of care for up to 90 days or as specifically noted. Attestation, signature or co-signature of physician indicates approval of certification requirements.    ________________________ ____________ __________  Physician Signature   Date   Time  [facility]  PHYSICAL THERAPY  [x] EVALUATION  [] DAILY NOTE (LAND) [] DAILY NOTE (AQUATIC ) [] PROGRESS NOTE [] DISCHARGE NOTE    [x] [address]   [] [place] [address]    [] [facility]   [] Critical access hospital    Date: 2021  Patient Name:  Dorothy Vivas  : 1975  MRN: [id number]  CSN: [id number]    Referring Practitioner Puneet Salas DO   Diagnosis Sprain of calcaneofibular ligament of left ankle, initial encounter [S93.412A]    Treatment Diagnosis Significant swelling and pain left lateral ankle with difficulty walking, standing, AROM , transfers sit <>stand   Date of Evaluation 21    Additional Pertinent History HTN      Functional Outcome Measure Used FAA   Functional Outcome Score  (21)       Insurance: Primary: Payor: R /  /  / ,   Secondary:    Authorization Information: PRE CERTIFICATION REQUIRED: NO  INSURANCE THERAPY BENEFIT:  ALLOWED 60 VISTIS PT/OT/ST COMBINED PER CALENDAR YEAR. AQUATIC THERAPY COVERED: YES  MODALITIES COVERED:  YES, YES MASSAGE  TELEHEALTH COVERED: YES  REFERENCE NUMBER: [id number]   Visit # 1, 1/10 for progress note   Visits Allowed: 60 visits   Recertification Date:    Physician Follow-Up: Call following PT appointment for follow up.  Has not followed with orthopedist. Physician Orders:    History of Present Illness: Patient went to ER 8/31/2021 when she slipped and fell from a puddle of water. Noted swelling and bruising immediately with pain at her left ankle. Xrays have revealed no fracture or dislocation of ankle. SUBJECTIVE:Patient reports of pain and swelling at left ankle. Notes area tender to touch. Also describing left knee pain pointing to anterior aspect of knee/patellar region. Patient has been in walking boot since 9/1/2021. She does take boot off to ice and elevate. Boot does help and when walking without the boot feels like knee is going to give out and fall. She does take Flexeril as a muscle relaxant. Wearing boot at all times when wb through LLE. Social/Functional History and Current Status:  Medications and Allergies have been reviewed and are listed on Medical History Questionnaire. Sid Bautisat lives friend in a single story home with 3 steps with handrail. .    Task Previous Current   ADLs  Independent Modified Independent   IADL's Independent Modified Independent   Ambulation Independent Modified Independent wears ortho boot left LE   Transfers Independent Modified Independent   Recreation work, walking, take care of Mother,  Modified Independent   Community Integration Independent Modified Independent   Driving Active  Drives with self-imposed restrictions   Work [employer]. 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 Objective:    GENERAL   Pain 7/10 left lateral ankle. Observation Note patient wearing ortho boot LLE, visible swelling left ankle    Palpation Note palpable swelling, left lateral malleolus, talofibular ligament region, Note tenderness at gastrocsoleus region   Sensation Sensitive and irritated with palpation. Sensation intact.     Edema Note visible swelling at left ankle around left lateral malleolus, Circumferential measurement 0.5 cm difference from right ankle. Accessory Motion        LOWER EXTREMITY RANGE OF MOTION    Left Right Comments   Knee Flexion 80 degrees painful medial joint line 125 degrees    Knee Extension 30 degrees painful 0 degrees    Knee Range of Motion is [facility]  []      Ankle Plantarflexion 40 degrees WNLS    Ankle Dorsiflexion -10 degrees from neutral  WNLS    Ankle Inversion 10-15 degrees WNLS    Ankle Eversion 10-15 degrees WNLS    Toe Flexion WNLS WNLS    Toe Extension WNLS WN    Ankle Range of Motion is [facility]  []        Toe Range of Motion is [facility]  [x]       LOWER EXTREMITY STRENGTH    Left Right Comments   Hip Flexion Pain at left knee hip flexion 4-/5 5/5    Hip Abduction      Hip Extension      Knee Flexion Not able WNLS Left ankle pain with AROM limited in all motions 3-/5. Left knee pain with AROM medial joint line with limited extension 30 degrees painful. Knee Extension Not able  WNLS    Ankle DF Not able WNLS    Ankle PF Not able  WNLS    Ankle Inversion Not able  WNLS    Ankle Eversion Not able  WNLs    Toe Flexion      Toe Extension      LE strength is [facility]  []            SPECIAL TESTS (+/-)    Left Right Comments   Kmii - -                              GAIT ANALYSIS:Patient ambulates with moderate antalgic gait with decreased wt shift through LLE due to left knee pain. Ortho boot on left LLE. Limited tolerance to wb through LLE due to left knee pain. Discussed ambulation with single axillary crutch to decrease stress on left knee. BALANCE/PROPRIOCEPTION          Single leg stance                                          Left Right Pain/Comments   Eyes open                                        Sec         Sec NOT ASSED for balance due to swelling pain and wearing ortho boot. Too inflamed and irritated.     Eyes closed                                                   Sec         Sec        FUNCTIONAL TESTS    Pain No Pain Comments   Stair navigation   Wearing ortho boot    Squat   Unable   Single Leg Hop   Unable    Single Leg Heel Raise   Unable        TREATMENT   Precautions: Significant swelling and pain at left lateral ankle region. Tenderness and limited AROM due to pain. Tissue shortening. Pain:     X in shaded column indicates activity completed today   Modalities Parameters/  Location  Notes                     Manual Therapy Time/Technique  Notes                     Exercise/Intervention   Notes   Active ankle dorsiflexion and plantarflexion 10x  x    Instruction in ice and elevation, Continue to wear ortho boot on LLE. Call physician regarding further follow up regarding left ankle and left knee. x                                                                     Specific Interventions Next Treatment: ice, possible Dry needling for left lateral ankle sprain, AROM left ankle beginning with ankle df/pf only. Discuss with doctor left knee consult/evaluation, left ankle orthopedist visit and further testing. Activity/Treatment Tolerance:  []  Patient tolerated treatment well  []  Patient limited by fatigue  [x]  Patient limited by pain   []  Patient limited by medical complications  [x]  Other: left knee pain as well with limited ROM, strength  Assessment: Patient referred to PT with left lateral ankle sprain to calcaneofibular ligament. Noted continued swelling at this left ankle laterally has been ongoing even since ER visit on 8/31/2021. She has significant pain and tenderness left lateral ankle with moderate swelling noted. Due to duration of swelling and pain and limited ROM may benefit from ortho consult and further imaging of ankle. She also has left knee pain medial joint line with limited ROM. Requesting further evaluation of left knee pain as well. Patient has just initiated PT eval on this date of 9/22/2021. Will follow conservatively for gentle AROM, pain management, and progression as tolerated. 1-2x per week with above recommendations also. Soft Tissue Mobilization, Pain Management, Home Exercise Program, Patient Education, Integrative Dry Needling, Aquatics and Modalities    [x]  Plan of care initiated. Plan to see patient 2 times per week for 8 weeks to address the treatment planned outlined above. []  Continue with current plan of care  [x]  Modify plan of care as follows: Follow up with physician for evaluation of left knee and possible further consult with ortho on left ankle sprain.    []  Hold pending physician visit  []  Discharge    Time In 0905   Time Out 0940   Timed Code Minutes: 7 min   Total Treatment Time: 45 min     Electronically Signed by: Yusef Lopez PT

## 2021-09-23 ENCOUNTER — TELEPHONE (OUTPATIENT)
Dept: FAMILY MEDICINE CLINIC | Age: 46
End: 2021-09-23

## 2021-09-23 ENCOUNTER — OFFICE VISIT (OUTPATIENT)
Dept: FAMILY MEDICINE CLINIC | Age: 46
End: 2021-09-23
Payer: COMMERCIAL

## 2021-09-23 ENCOUNTER — HOSPITAL ENCOUNTER (OUTPATIENT)
Dept: GENERAL RADIOLOGY | Age: 46
Discharge: HOME | End: 2021-09-23
Payer: COMMERCIAL

## 2021-09-23 ENCOUNTER — HOSPITAL ENCOUNTER (OUTPATIENT)
Age: 46
Discharge: HOME | End: 2021-09-23
Payer: COMMERCIAL

## 2021-09-23 VITALS
RESPIRATION RATE: 16 BRPM | HEART RATE: 87 BPM | WEIGHT: 195.6 LBS | TEMPERATURE: 97.1 F | OXYGEN SATURATION: 99 % | DIASTOLIC BLOOD PRESSURE: 86 MMHG | BODY MASS INDEX: 28.97 KG/M2 | SYSTOLIC BLOOD PRESSURE: 132 MMHG | HEIGHT: 69 IN

## 2021-09-23 DIAGNOSIS — M54.2 NECK PAIN: ICD-10-CM

## 2021-09-23 DIAGNOSIS — S93.412A SPRAIN OF CALCANEOFIBULAR LIGAMENT OF LEFT ANKLE, INITIAL ENCOUNTER: Primary | ICD-10-CM

## 2021-09-23 DIAGNOSIS — M25.562 ACUTE PAIN OF LEFT KNEE: ICD-10-CM

## 2021-09-23 PROCEDURE — 99213 OFFICE O/P EST LOW 20 MIN: CPT | Performed by: STUDENT IN AN ORGANIZED HEALTH CARE EDUCATION/TRAINING PROGRAM

## 2021-09-23 PROCEDURE — 72040 X-RAY EXAM NECK SPINE 2-3 VW: CPT

## 2021-09-23 ASSESSMENT — ENCOUNTER SYMPTOMS
EYE PAIN: 0
COUGH: 0
DIARRHEA: 0
ABDOMINAL PAIN: 0
TROUBLE SWALLOWING: 0
SHORTNESS OF BREATH: 0
BLOOD IN STOOL: 0
CONSTIPATION: 0

## 2021-09-23 NOTE — PROGRESS NOTES
Rosita Lombardo is a 42 y.o. female who presents today for:  Chief Complaint   Patient presents with    Follow-up     3 week Follow-up Sprain of calcaneofibular ligament of left ankle still having Left ankle pain and swelling and body aches and back stiffness     HPI:   Rosita Lombardo is 42 y. o. who presents today for follow up on left ankle sprain. She is still having pain in the left ankle. She is able to ambulate in the walking boot. However, she feels like her knee is very unstable. Able to bend past 90 degrees. Started physical therapy yesterday. Pain still present in the neck midline. Difficulty with rotation of head and forward flexion. Flexeril she takes as needed, not helping significantly.     Objective:     Vitals:    09/23/21 1124   BP: 132/86   Site: Right Upper Arm   Position: Sitting   Cuff Size: Medium Adult   Pulse: 87   Resp: 16   Temp: 97.1 °F (36.2 °C)   TempSrc: Temporal   SpO2: 99%   Weight: 195 lb 9.6 oz (88.7 kg)   Height: 5' 9\" (1.753 m)       Wt Readings from Last 3 Encounters:   09/23/21 195 lb 9.6 oz (88.7 kg)   09/01/21 194 lb 6.4 oz (88.2 kg)   04/19/21 184 lb (83.5 kg)       BP Readings from Last 3 Encounters:   09/23/21 132/86   09/01/21 120/84   04/19/21 118/64       Lab Results   Component Value Date    WBC 7.3 02/24/2020    HGB 13.9 02/24/2020    HCT 42.4 02/24/2020    MCV 91.4 02/24/2020     02/24/2020     Lab Results   Component Value Date     04/05/2021    K 4.1 04/05/2021     04/05/2021    CO2 28 04/05/2021    BUN 13 04/05/2021    CREATININE 0.7 04/05/2021    GLUCOSE 92 04/05/2021    CALCIUM 9.7 04/05/2021    PROT 7.9 04/05/2021    LABALBU 5.2 (H) 04/05/2021    BILITOT 1.2 04/05/2021    ALKPHOS 76 04/05/2021    AST 27 04/05/2021    ALT 27 04/05/2021    LABGLOM >90 04/05/2021    GFRAA >60 02/07/2019     Lab Results   Component Value Date    TSH 2.060 02/24/2020     No results found for: LABA1C  No results found for: EAG  Lab Results Component Value Date    CHOL 185 04/05/2021    CHOL 156 02/24/2020    CHOL 181 02/07/2019     Lab Results   Component Value Date    TRIG 62 04/05/2021    TRIG 80 02/24/2020    TRIG 53 02/07/2019     Lab Results   Component Value Date    HDL 90 04/05/2021    HDL 72 02/24/2020    HDL 80 02/07/2019     Lab Results   Component Value Date    LDLCHOLESTEROL 90 02/07/2019    LDLCALC 83 04/05/2021    LDLCALC 68 02/24/2020       No results found for: LABMICR, [id number]    Review of Systems   Constitutional: Negative for chills, fatigue and fever. HENT: Negative for congestion, ear pain, postnasal drip and trouble swallowing. Eyes: Negative for pain and visual disturbance. Respiratory: Negative for cough and shortness of breath. Cardiovascular: Negative for chest pain and palpitations. Gastrointestinal: Negative for abdominal pain, blood in stool, constipation and diarrhea. Genitourinary: Negative for dysuria and hematuria. Musculoskeletal: Positive for arthralgias, gait problem (pain with walking; unstable knee) and myalgias. Skin: Negative for rash and wound. Neurological: Negative for dizziness and headaches. Psychiatric/Behavioral: The patient is not nervous/anxious. Physical Exam  Vitals and nursing note reviewed. Constitutional:       General: She is not in acute distress. Appearance: Normal appearance. She is well-developed. She is not ill-appearing or diaphoretic. HENT:      Head: Normocephalic and atraumatic. Right Ear: External ear normal.      Left Ear: External ear normal.      Nose: Nose normal.   Eyes:      General: No scleral icterus. Right eye: No discharge. Left eye: No discharge. Conjunctiva/sclera: Conjunctivae normal.   Cardiovascular:      Rate and Rhythm: Normal rate and regular rhythm. Heart sounds: Normal heart sounds. No murmur heard. Pulmonary:      Effort: Pulmonary effort is normal.      Breath sounds: Normal breath sounds. Abdominal:      Palpations: Abdomen is soft. Tenderness: There is no abdominal tenderness. Musculoskeletal:         General: Swelling (left knee) and tenderness (left knee at medial joint line; and spinous processes of c6/7) present. No deformity. Cervical back: Normal range of motion. Comments: Decreased ROM with rotation to left and right and decreased in forward flexion   Skin:     General: Skin is warm and dry. Findings: No erythema or rash. Neurological:      Mental Status: She is alert and oriented to person, place, and time. Psychiatric:         Behavior: Behavior normal.         Thought Content: Thought content normal.         Judgment: Judgment normal.       Immunization History   Administered Date(s) Administered    COVID-19, J&J, PF, 0.5 mL 03/31/2021    Hepatitis A Ped/Adol (Havrix, Vaqta) 08/08/2016, 02/27/2017    Hepatitis B 08/08/2016, 09/07/2016, 02/27/2017    Influenza Virus Vaccine 03/02/2018    Influenza, High Dose (Fluzone 65 yrs and older) 11/01/2017    Tdap (Boostrix, Adacel) 08/08/2016       Health Maintenance Due   Topic Date Due    Hepatitis C screen  Never done    Flu vaccine (1) 09/01/2021          Food Insecurity: Unknown    Worried About Running Out of Food in the Last Year: Patient refused    Ran Out of Food in the Last Year: Patient refused       Assessment / Plan:      Diagnosis Orders   1. Sprain of calcaneofibular ligament of left ankle, initial encounter  BIJAN - Loel Alpers, MD, Orthopedic Surgery, [facility]   2. Acute pain of left knee     3. Neck pain  XR CERVICAL SPINE (2-3 VIEWS)     Referral to Ortho for follow up on ankle sprain and knee pain given feelings that knee is unstable. Stretches for neck provided for muscle spasms  XR neck given midline bony tenderness         Return if symptoms worsen or fail to improve. Medications Prescribed:  No orders of the defined types were placed in this encounter.       Future Appointments Date Time Provider Lia [place]   9/24/2021 10:45 AM Deirdre Brochure, PTA [facility] [place] [facility]   9/29/2021  9:45 AM Deirdre Brochure, PTA [facility] [place] [facility]   10/1/2021  7:00 AM Sissy Stauffer, PT [facility] [place] [facility]   10/4/2021  1:00 PM Sissy Stauffer PT [facility] [place] [facility]   10/8/2021  9:15 AM Sissy Stauffer, PT [facility] [place]       Patient given educational materials - see patient instructions. Discussed use, benefit, and sideeffects of prescribed medications. All patient questions answered. Pt voiced understanding. Reviewed health maintenance. Instructed to continue current medications, diet and exercise. Patient agreed with treatment plan. Follow up as directed.      Electronically signed by Francine Burton DO on 9/23/2021 at 8:51 PM

## 2021-09-23 NOTE — PATIENT INSTRUCTIONS
Thank you   1. Thank you for trusting us with your healthcare needs. You may receive a survey regarding today's visit. It would help us out if you would take a few moments to provide your feedback. We value your input. 2. Please bring in ALL medications BOTTLES, including inhalers, herbal supplements, over the counter, prescribed & non-prescribed medicine. The office would like actual medication bottles and a list.   3. Please note our OFFICE POLICIES:   a. Prior to getting your labs drawn, please check with your insurance company for benefits and eligibility of lab services. Often, insurance companies cover certain tests for preventative visits only. It is patient's responsibility to see what is covered. b. We are unable to change a diagnosis after the test has been performed. c. Lab orders will not be re-printed. Please hold onto your original lab orders and take them to your lab to be completed. d. If you no show your scheduled appointment three times, you will be dismissed from this practice. e. Vergil Moan must be completed 24 hours prior to your schedule appointment. 4. If the list below has been completed, PLEASE FAX RECORDS TO OUR OFFICE @ [phone]. Once the records have been received we will update your records at our office:  Health Maintenance Due   Topic Date Due    Hepatitis C screen  Never done    Flu vaccine (1) 09/01/2021           Patient Education        Neck Spasm: Exercises  Introduction  Here are some examples of exercises for you to try. The exercises may be suggested for a condition or for rehabilitation. Start each exercise slowly. Ease off the exercises if you start to have pain. You will be told when to start these exercises and which ones will work best for you. How to do the exercises  Levator scapula stretch    1. Sit in a firm chair, or stand up straight. 2. Gently tilt your head toward your left shoulder.   3. Turn your head to look down into your armpit, bending your head slightly forward. Let the weight of your head stretch your neck muscles. 4. Hold for 15 to 30 seconds. 5. Return to your starting position. 6. Follow the same instructions above, but tilt your head toward your right shoulder. 7. Repeat 2 to 4 times toward each shoulder. Upper trapezius stretch    1. Sit in a firm chair, or stand up straight. 2. This stretch works best if you keep your shoulder down as you lean away from it. To help you remember to do this, start by relaxing your shoulders and lightly holding on to your thighs or your chair. 3. Tilt your head toward your shoulder and hold for 15 to 30 seconds. Let the weight of your head stretch your muscles. 4. If you would like a little added stretch, place your arm behind your back. Use the arm opposite of the direction you are tilting your head. For example, if you are tilting your head to the left, place your right arm behind your back. 5. Repeat 2 to 4 times toward each shoulder. Neck rotation    1. Sit in a firm chair, or stand up straight. 2. Keeping your chin level, turn your head to the right, and hold for 15 to 30 seconds. 3. Turn your head to the left, and hold for 15 to 30 seconds. 4. Repeat 2 to 4 times to each side. Chin tuck    1. Lie on the floor with a rolled-up towel under your neck. Your head should be touching the floor. 2. Slowly bring your chin toward the front of your neck. 3. Hold for a count of 6, and then relax for up to 10 seconds. 4. Repeat 8 to 12 times. Forward neck flexion    1. Sit in a firm chair, or stand up straight. 2. Bend your head forward. 3. Hold for 15 to 30 seconds, then return to your starting position. 4. Repeat 2 to 4 times. Follow-up care is a key part of your treatment and safety. Be sure to make and go to all appointments, and call your doctor if you are having problems. It's also a good idea to know your test results and keep a list of the medicines you take.   Where can you learn more?  Go to [email or web address] and sign in to your [employer] account. Enter P962 in the [facility] box to learn more about \"Neck Spasm: Exercises. \"     If you do not have an account, please click on the \"Sign Up Now\" link. Current as of: July 1, 2021               Content Version: 13.0  © 3994-7298 Healthwise, Incorporated. Care instructions adapted under license by [facility] ([facility]). If you have questions about a medical condition or this instruction, always ask your healthcare professional. [place] [address] any warranty or liability for your use of this information.

## 2021-09-23 NOTE — PROGRESS NOTES
Health Maintenance Due   Topic Date Due    Hepatitis C screen  Never done    Flu vaccine (1) 09/01/2021

## 2021-09-23 NOTE — TELEPHONE ENCOUNTER
A fax was received from [facility]. The form needs to be signed, completed, then faxed back. The forms are in the provider's mailbox.

## 2021-09-23 NOTE — PROGRESS NOTES
S: 42 y.o. female with   Chief Complaint   Patient presents with    Follow-up     3 week Follow-up Sprain of calcaneofibular ligament of left ankle still having Left ankle pain and swelling and body aches and back stiffness       Still not better ith the ankle - in a walking boot and PT - also feels like theknee will give out    Neck pain and hurts to touch    BP Readings from Last 3 Encounters:   09/23/21 132/86   09/01/21 120/84   04/19/21 118/64     Wt Readings from Last 3 Encounters:   09/23/21 195 lb 9.6 oz (88.7 kg)   09/01/21 194 lb 6.4 oz (88.2 kg)   04/19/21 184 lb (83.5 kg)           O: VS:   Vitals:    09/23/21 1124   BP: 132/86   Site: Right Upper Arm   Position: Sitting   Cuff Size: Medium Adult   Pulse: 87   Resp: 16   Temp: 97.1 °F (36.2 °C)   TempSrc: Temporal   SpO2: 99%   Weight: 195 lb 9.6 oz (88.7 kg)   Height: 5' 9\" (1.753 m)     Body mass index is 28.89 kg/m². Lab Results   Component Value Date    WBC 7.3 02/24/2020    HGB 13.9 02/24/2020    HCT 42.4 02/24/2020     02/24/2020    CHOL 185 04/05/2021    TRIG 62 04/05/2021    HDL 90 04/05/2021    LDLCALC 83 04/05/2021    AST 27 04/05/2021     04/05/2021    K 4.1 04/05/2021     04/05/2021    CREATININE 0.7 04/05/2021    BUN 13 04/05/2021    CO2 28 04/05/2021    TSH 2.060 02/24/2020    LABGLOM >90 04/05/2021    CALCIUM 9.7 04/05/2021       XR TIBIA FIBULA LEFT (2 VIEWS)    Result Date: 9/2/2021  PROCEDURE: XR TIBIA FIBULA LEFT (2 VIEWS) CLINICAL INFORMATION: Sprain of calcaneofibular ligament of left ankle, subsequent encounter COMPARISON: No prior study. TECHNIQUE: AP and lateral views of the tibia and fibula performed. FINDINGS: MINERALIZATION: Normal. ALIGNMENT: Anatomic. FRACTURE: None. OSSEOUS DESTRUCTION/PERIOSTITIS: None. KNEE/ANKLE JOINTS: Unremarkable. SOFT TISSUES: Normal.     1. Normal tib-fib series. 2. No fracture or dislocation. **This report has been created using voice recognition software.   It may contain minor errors which are inherent in voice recognition technology. ** Final report electronically signed by Dr. Chela Brito on 9/2/2021 4:12 PM           Diagnosis Orders   1. Sprain of calcaneofibular ligament of left ankle, initial encounter  BIJAN Ritchie MD, Orthopedic Surgery, [place]   2. Acute pain of left knee     3. Neck pain  XR CERVICAL SPINE (2-3 VIEWS)       Plan  c spine xr    Refer to ortho       Return if symptoms worsen or fail to improve. Orders Placed:  Orders Placed This Encounter   Procedures    XR CERVICAL SPINE (2-3 VIEWS)    BIJAN Ritchie MD, Orthopedic Surgery, [place]     Medications Prescribed:  No orders of the defined types were placed in this encounter. Future Appointments   Date Time Provider Lia Light   9/29/2021  9:45 AM Bobbi Avendaño, PTA [facility] [place] [facility]   10/1/2021  7:00 AM Janus Yuki, PT [facility] [place] [facility]   10/4/2021  1:00 PM Janus Yuki, PT [facility] [place] [facility]   10/8/2021  9:15 AM Janus Yuki, PT [facility] [address] Maintenance Due   Topic Date Due    Hepatitis C screen  Never done    Flu vaccine (1) 09/01/2021         Attending Physician Statement  I have discussed the case, including pertinent history and exam findings with the resident. [id number] agree with the documented assessment and plan as documented by the resident.   GE modifier added to this encounter      Daniel Ramsey DO 9/27/2021 3:34 PM

## 2021-09-24 ENCOUNTER — HOSPITAL ENCOUNTER (OUTPATIENT)
Dept: PHYSICAL THERAPY | Age: 46
Setting detail: THERAPIES SERIES
Discharge: HOME | End: 2021-09-24
Payer: COMMERCIAL

## 2021-09-24 PROCEDURE — 97140 MANUAL THERAPY 1/> REGIONS: CPT

## 2021-09-24 PROCEDURE — 97110 THERAPEUTIC EXERCISES: CPT

## 2021-09-24 NOTE — PROGRESS NOTES
[address] [facility]  PHYSICAL THERAPY  [] EVALUATION  [x] DAILY NOTE (LAND) [] DAILY NOTE (AQUATIC ) [] PROGRESS NOTE [] DISCHARGE NOTE    [x] OUTPATIENT REHABILITATION [facility]   [] [place] [address]    [] [facility]   [] Zoila Maier    Date: 2021  Patient Name:  Jenny Sheets  : 1975  MRN: [id number]  CSN: [id number]    Referring Practitioner Marychuy Portillo DO   Diagnosis Sprain of calcaneofibular ligament of left ankle, initial encounter [S93.412A]    Treatment Diagnosis Significant swelling and pain left lateral ankle with difficulty walking, standing, AROM , transfers sit <>stand   Date of Evaluation 21    Additional Pertinent History HTN      Functional Outcome Measure Used FAA   Functional Outcome Score  (21)       Insurance: Primary: Payor: UMR /  /  / ,   Secondary:    Authorization Information: PRE CERTIFICATION REQUIRED: NO  INSURANCE THERAPY BENEFIT:  ALLOWED 60 VISTIS PT/OT/ST COMBINED PER CALENDAR YEAR. AQUATIC THERAPY COVERED: YES  MODALITIES COVERED:  YES, YES MASSAGE  TELEHEALTH COVERED: YES  REFERENCE NUMBER: [id number]   Visit # 2, 2/10 for progress note   Visits Allowed: 60 visits   Recertification Date:    Physician Follow-Up: Call following PT appointment for follow up. Has not followed with orthopedist.    Physician Orders:    History of Present Illness: Patient went to ER 2021 when she slipped and fell from a puddle of water. Noted swelling and bruising immediately with pain at her left ankle. Xrays have revealed no fracture or dislocation of ankle. SUBJECTIVE: Patient reports that yesterday she had a Doctor's appointment. She reports that they want her to go to an orthopedic Doctor. She states that it is a walk in office so she is going to try to go today or early Monday morning. She rates her left ankle and left knee pain a 7/10 today.      TREATMENT   Precautions: Significant swelling and pain at left lateral ankle region. Tenderness and limited AROM due to pain. Tissue shortening. Pain: 7/10 left ankle and left knee pain    X in shaded column indicates activity completed today   Modalities Parameters/  Location  Notes   Cold pack to patient's L ankle  x5 min  X At end of session. Patient R sidelying with pillow under leg of support. Manual Therapy Time/Technique  Notes   STM to patient's L calf region with patient R sidelying and pillow between knees x8 min  X Performed to assist with decreasing tightness to assist with increasing mobility. She reported a \"burning and aching\" sensation at times during               Exercise/Intervention   Notes   Active ankle dorsiflexion and plantarflexion 15x  X    Instruction in ice and elevation, Continue to wear ortho boot on LLE. Call physician regarding further follow up regarding left ankle and left knee. Seated heel/toe raises 15x  X    Seated gentle gastroc stretch with strap 3x 10 sec  X    Seated rockerboard taps (front/back)  15x  X    Seated L quad set with foot on stool 10x 5 sec  X Noted some medial knee pain                                         Specific Interventions Next Treatment: ice, possible Dry needling for left lateral ankle sprain, AROM left ankle beginning with ankle df/pf only. Discuss with doctor left knee consult/evaluation, left ankle orthopedist visit and further testing. Activity/Treatment Tolerance:  []  Patient tolerated treatment well  []  Patient limited by fatigue  [x]  Patient limited by pain   []  Patient limited by medical complications  [x]  Other: left knee pain as well with limited ROM, strength    Assessment: Added therapeutic exercises as documented above and manual therapy. She was provided with demonstration and cues on proper technique with exercises to ensure maximal muscle activation. She reported a \"burning and aching\" sensation at times during manual therapy today.  Ended session with ice to patient's left ankle. Patient educated on monitoring how she feels after therapy today. She is going to try to go to the orthopedic doctor today or Monday morning. Body Structures/Functions/Activity Limitations: edema, impaired activity tolerance, impaired balance, impaired ROM, impaired strength, pain and abnormal gait  Prognosis: fair    GOALS:  Patient Goal: To be able to return to work without left ankle pain, swelling, and improved walking tolerance. Also to have less left knee pain. Short Term Goals: 4 weeks  1. Patient to report of decreased pain levels left lateral ankle with standing, walking, and AROM  from 7/10 to 3/10   2. Patient to demonstrate increased left ankle ROM in dorsiflexion from -10 degrees from neutral dorsiflexion to 10 degrees past neutral dorsiflexion in order to have improved ankle motion and progression with ex to further ROM ex at left ankle  3. Patient to tolerate MMT without ankle pain with strength 3-/5 to 4/5 with improved stability through ankle without pain and improved strength with walking, standing statically. 4. Patient to be able to narrow stance with eyes open with equal wt shift through right/left LEs. Tandem stance for 45 seconds r/l without sway when able to be further assessed. Long Term Goals: 8 weeks  1. FAAM 21/84 to 40/84  2. Independent in HEP with decreased pain, improved ROM, strength with progressing improvement with functional mobility with walking,standing, transfers. Patient Education:   [x]  HEP/Education Completed: Added manual therapy and therapeutic exercises as documented above. She was provided with handout of updated HEP. Patient educated on monitoring her pain and symptoms after today's session.  [employer] Access Code:  []  No new Education completed  [x]  Reviewed Prior HEP      [x]  Patient verbalized and/or demonstrated understanding of education provided.   []  Patient unable to verbalize and/or demonstrate understanding of education provided. Will continue education. []  Barriers to learning:     PLAN:  Treatment Recommendations: Strengthening, Range of Motion, Balance Training, Functional Mobility Training, Gait Training, Stair Training, Neuromuscular Re-education, Manual Therapy - Soft Tissue Mobilization, Pain Management, Home Exercise Program, Patient Education, Integrative Dry Needling, Aquatics and Modalities    []  Plan of care initiated. Plan to see patient 2 times per week for 8 weeks to address the treatment planned outlined above. [x]  Continue with current plan of care  [x]  Modify plan of care as follows: Follow up with physician for evaluation of left knee and possible further consult with ortho on left ankle sprain.    []  Hold pending physician visit  []  Discharge    Time In 1046   Time Out 1117   Timed Code Minutes: 26 min   Total Treatment Time: 31 min     Electronically Signed by: Sol Urbina PTA

## 2021-09-27 ENCOUNTER — TELEPHONE (OUTPATIENT)
Dept: FAMILY MEDICINE CLINIC | Age: 46
End: 2021-09-27

## 2021-09-27 NOTE — TELEPHONE ENCOUNTER
OLINDA Pt saw Sally Pratt today and they extended her time off until 10/12. Jaky Stewart will be sending us forms about this.

## 2021-09-29 ENCOUNTER — HOSPITAL ENCOUNTER (OUTPATIENT)
Dept: PHYSICAL THERAPY | Age: 46
Setting detail: THERAPIES SERIES
Discharge: HOME | End: 2021-09-29
Payer: COMMERCIAL

## 2021-09-29 PROCEDURE — 97110 THERAPEUTIC EXERCISES: CPT

## 2021-09-29 PROCEDURE — 97140 MANUAL THERAPY 1/> REGIONS: CPT

## 2021-09-29 NOTE — PROGRESS NOTES
[address] [facility]  PHYSICAL THERAPY  [] EVALUATION  [x] DAILY NOTE (LAND) [] DAILY NOTE (AQUATIC ) [] PROGRESS NOTE [] DISCHARGE NOTE    [x] OUTPATIENT REHABILITATION CENTER [facility]   [] [place] [address]    [] [facility]   [] Kay Cisneros    Date: 2021  Patient Name:  Rosita Lombardo  : 1975  MRN: [id number]  CSN: [id number]    Referring Practitioner Marcel Lewis DO   Diagnosis Sprain of calcaneofibular ligament of left ankle, initial encounter [S93.412A]    Treatment Diagnosis Significant swelling and pain left lateral ankle with difficulty walking, standing, AROM , transfers sit <>stand   Date of Evaluation 21    Additional Pertinent History HTN      Functional Outcome Measure Used FAA   Functional Outcome Score  (21)       Insurance: Primary: Payor: UMR /  /  / ,   Secondary:    Authorization Information: PRE CERTIFICATION REQUIRED: NO  INSURANCE THERAPY BENEFIT:  ALLOWED 60 VISTIS PT/OT/ST COMBINED PER CALENDAR YEAR. AQUATIC THERAPY COVERED: YES  MODALITIES COVERED:  YES, YES MASSAGE  TELEHEALTH COVERED: YES  REFERENCE NUMBER: [id number]   Visit # 3, 3/10 for progress note   Visits Allowed: 60 visits   Recertification Date:    Physician Follow-Up: Call following PT appointment for follow up. Has not followed with orthopedist.    Physician Orders:    History of Present Illness: Patient went to ER 2021 when she slipped and fell from a puddle of water. Noted swelling and bruising immediately with pain at her left ankle. Xrays have revealed no fracture or dislocation of ankle. SUBJECTIVE: Patient reports that after her last session her soreness was a little worse. She states that today her left ankle and knee pain is a 6/10. She states that she has been icing and completing her exercises at home. She denies any changes since her last appointment.  She went to the orthopedic doctor but reports that the doctor did not do any further testing. The doctor gave her another order for therapy. She states the the doctor told her \"you have the increased swelling because it is a bad sprain\". She states that she is suppose to return to work on 10/12/21 and she states that on 10/25/21 She returns the the orthopedic doctor. TREATMENT   Precautions: Significant swelling and pain at left lateral ankle region. Tenderness and limited AROM due to pain. Tissue shortening. Pain: 6/10 left ankle and left knee pain    X in shaded column indicates activity completed today   Modalities Parameters/  Location  Notes   Cold pack to patient's L ankle  x5 min   At end of session. Patient R sidelying with pillow under leg of support. Educated on performing at home today. Manual Therapy Time/Technique  Notes   STM to patient's L calf region with patient R sidelying and pillow between knees x8 min  X Performed to assist with decreasing tightness to assist with increasing mobility. She reported a \"burning and aching\" sensation at times during               Exercise/Intervention   Notes   Active ankle dorsiflexion and plantarflexion 15x  X    Instruction in ice and elevation, Continue to wear ortho boot on LLE. Call physician regarding further follow up regarding left ankle and left knee. Seated heel/toe raises 15x  X    Seated gentle gastroc stretch with strap 3x 10 sec  X    Seated rockerboard taps (front/back)  15x  X    Seated L quad set with foot on stool 10x 5 sec  X Noted some medial knee pain                                         Specific Interventions Next Treatment: ice, possible Dry needling for left lateral ankle sprain, AROM left ankle beginning with ankle df/pf only. Discuss with doctor left knee consult/evaluation, left ankle orthopedist visit and further testing.      Activity/Treatment Tolerance:  []  Patient tolerated treatment well  []  Patient limited by fatigue  [x]  Patient limited by pain   []  Patient limited by medical complications  [x]  Other: left knee pain as well with limited ROM, strength    Assessment: Continued with therapeutic exercises as documented above. She was provided with cues on proper technique with exercises to ensure maximal muscle activation. Continued with manual therapy to assist with decreasing tightness. She reported that her pain was a 7/10 at the conclusion of session. Patient educated on continuing to ice her ankle at home. Body Structures/Functions/Activity Limitations: edema, impaired activity tolerance, impaired balance, impaired ROM, impaired strength, pain and abnormal gait  Prognosis: fair    GOALS:  Patient Goal: To be able to return to work without left ankle pain, swelling, and improved walking tolerance. Also to have less left knee pain. Short Term Goals: 4 weeks  1. Patient to report of decreased pain levels left lateral ankle with standing, walking, and AROM  from 7/10 to 3/10   2. Patient to demonstrate increased left ankle ROM in dorsiflexion from -10 degrees from neutral dorsiflexion to 10 degrees past neutral dorsiflexion in order to have improved ankle motion and progression with ex to further ROM ex at left ankle  3. Patient to tolerate MMT without ankle pain with strength 3-/5 to 4/5 with improved stability through ankle without pain and improved strength with walking, standing statically. 4. Patient to be able to narrow stance with eyes open with equal wt shift through right/left LEs. Tandem stance for 45 seconds r/l without sway when able to be further assessed. Long Term Goals: 8 weeks  1. FAAM 21/84 to 40/84  2. Independent in HEP with decreased pain, improved ROM, strength with progressing improvement with functional mobility with walking,standing, transfers. Patient Education:   [x]  HEP/Education Completed: Continue with icing her ankle at home and completing her exercises.        [employer] Access Code:  []  No new Education completed  [x] Reviewed Prior HEP      [x]  Patient verbalized and/or demonstrated understanding of education provided. []  Patient unable to verbalize and/or demonstrate understanding of education provided. Will continue education. []  Barriers to learning:     PLAN:  Treatment Recommendations: Strengthening, Range of Motion, Balance Training, Functional Mobility Training, Gait Training, Stair Training, Neuromuscular Re-education, Manual Therapy - Soft Tissue Mobilization, Pain Management, Home Exercise Program, Patient Education, Integrative Dry Needling, Aquatics and Modalities    []  Plan of care initiated. Plan to see patient 2 times per week for 8 weeks to address the treatment planned outlined above. [x]  Continue with current plan of care  []  Modify plan of care as follows: Follow up with physician for evaluation of left knee and possible further consult with ortho on left ankle sprain.    []  Hold pending physician visit  []  Discharge    Time In 0949   Time Out 1015   Timed Code Minutes: 26 min   Total Treatment Time: 26 min     Electronically Signed by: Shaun Kc PTA

## 2021-10-01 ENCOUNTER — HOSPITAL ENCOUNTER (OUTPATIENT)
Dept: PHYSICAL THERAPY | Age: 46
Setting detail: THERAPIES SERIES
Discharge: HOME | End: 2021-10-01
Payer: COMMERCIAL

## 2021-10-01 PROCEDURE — 97110 THERAPEUTIC EXERCISES: CPT

## 2021-10-01 NOTE — PROGRESS NOTES
[address] [facility]  PHYSICAL THERAPY  [] EVALUATION  [x] DAILY NOTE (LAND) [] DAILY NOTE (AQUATIC ) [] PROGRESS NOTE [] DISCHARGE NOTE    [x] OUTPATIENT REHABILITATION [facility]   [] [place] [address]    [] [facility]   [] Jinny Ojs    Date: 10/1/2021  Patient Name:  Donald Short  : 1975  MRN: [id number]  CSN: [id number]    Referring Practitioner Mukund Dsouza DO   Diagnosis Sprain of calcaneofibular ligament of left ankle, initial encounter [S93.412A]    Treatment Diagnosis Significant swelling and pain left lateral ankle with difficulty walking, standing, AROM , transfers sit <>stand   Date of Evaluation 21    Additional Pertinent History HTN      Functional Outcome Measure Used FAA   Functional Outcome Score  (21)       Insurance: Primary: Payor: UMR /  /  / ,   Secondary:    Authorization Information: PRE CERTIFICATION REQUIRED: NO  INSURANCE THERAPY BENEFIT:  ALLOWED 60 VISTIS PT/OT/ST COMBINED PER CALENDAR YEAR. AQUATIC THERAPY COVERED: YES  MODALITIES COVERED:  YES, YES MASSAGE  TELEHEALTH COVERED: YES  REFERENCE NUMBER: [id number]   Visit # 4, 4/10 for progress note   Visits Allowed: 60 visits   Recertification Date:    Physician Follow-Up: Call following PT appointment for follow up. Has not followed with orthopedist.    Physician Orders:    History of Present Illness: Patient went to ER 2021 when she slipped and fell from a puddle of water. Noted swelling and bruising immediately with pain at her left ankle. Xrays have revealed no fracture or dislocation of ankle. SUBJECTIVE: Patient continues to have swelling and pain at left ankle. Not getting any better and frustrated. Continues to wear boot as advised. TREATMENT   Precautions: Significant swelling and pain at left lateral ankle region. Tenderness and limited AROM due to pain. Tissue shortening.     Pain: 6/10 left ankle and left knee pain    X in shaded column indicates activity completed today   Modalities Parameters/  Location  Notes   Cold pack to patient's L ankle  x5 min   At end of session. Patient R sidelying with pillow under leg of support. Educated on performing at home today. Manual Therapy Time/Technique  Notes   STM to patient's L calf region with patient R sidelying and pillow between knees x8 min  X Performed to assist with decreasing tightness to assist with increasing mobility. She reported a \"burning and aching\" sensation at times during   Kinesiotape for lateral ankle sprain figure eight. Page 224 in KT book  x          Exercise/Intervention   Notes   Active ankle dorsiflexion and plantarflexion 10x  X    Instruction in ice and elevation, Continue to wear ortho boot on LLE. Call physician regarding further follow up regarding left ankle and left knee. Seated heel/toe raises 10x  X    Seated gentle gastroc stretch with strap 3x 10 sec  X    Seated small disk with 1/2 croquet ball taps (front/back)  15x  X    Seated L quad set with foot on stool 10x 5 sec  X Noted some medial knee pain           Standing open chain ex: LLE hip flexion, hip abduction and extension. x Did not tolerate closed chain ex LLE due to ankle pain and knee pain so did open chain ex in standing   LAQs painfree range 10x   x                    Specific Interventions Next Treatment: ice, possible Dry needling for left lateral ankle sprain, AROM left ankle beginning with ankle df/pf only. Discuss with doctor left knee consult/evaluation, left ankle orthopedist visit and further testing. Activity/Treatment Tolerance:  []  Patient tolerated treatment well  []  Patient limited by fatigue  [x]  Patient limited by pain   []  Patient limited by medical complications  [x]  Other: left knee pain as well with limited ROM, strength    Assessment: KT tape figure 8 for left lateral ankle sprain. Added open chain 3 way hip LLE in standing.  LAQs through painfree range. Tolerated fair with pain at knee and ankle. KT for stability, pain and swelling. Will continue to follow for ex conservatively due to pain and sweling persistent at left lateral ankle and knee. Body Structures/Functions/Activity Limitations: edema, impaired activity tolerance, impaired balance, impaired ROM, impaired strength, pain and abnormal gait  Prognosis: fair    GOALS:  Patient Goal: To be able to return to work without left ankle pain, swelling, and improved walking tolerance. Also to have less left knee pain. Short Term Goals: 4 weeks  1. Patient to report of decreased pain levels left lateral ankle with standing, walking, and AROM  from 7/10 to 3/10   2. Patient to demonstrate increased left ankle ROM in dorsiflexion from -10 degrees from neutral dorsiflexion to 10 degrees past neutral dorsiflexion in order to have improved ankle motion and progression with ex to further ROM ex at left ankle  3. Patient to tolerate MMT without ankle pain with strength 3-/5 to 4/5 with improved stability through ankle without pain and improved strength with walking, standing statically. 4. Patient to be able to narrow stance with eyes open with equal wt shift through right/left LEs. Tandem stance for 45 seconds r/l without sway when able to be further assessed. Long Term Goals: 8 weeks  1. FAAM 21/84 to 40/84  2. Independent in HEP with decreased pain, improved ROM, strength with progressing improvement with functional mobility with walking,standing, transfers. Patient Education: Monitor for any redness or irritation from tape. Remove if any irritation. Wear for 4 days as tolerated. [x]  HEP/Education Completed: Continue with icing her ankle at home and completing her exercises.  [employer] Access Code:  []  No new Education completed  [x]  Reviewed Prior HEP      [x]  Patient verbalized and/or demonstrated understanding of education provided.   []  Patient unable to verbalize and/or demonstrate understanding of education provided. Will continue education. []  Barriers to learning:     PLAN:  Treatment Recommendations: Strengthening, Range of Motion, Balance Training, Functional Mobility Training, Gait Training, Stair Training, Neuromuscular Re-education, Manual Therapy - Soft Tissue Mobilization, Pain Management, Home Exercise Program, Patient Education, Integrative Dry Needling, Aquatics and Modalities    []  Plan of care initiated. Plan to see patient 2 times per week for 8 weeks to address the treatment planned outlined above. [x]  Continue with current plan of care  []  Modify plan of care as follows: Follow up with physician for evaluation of left knee and possible further consult with ortho on left ankle sprain.    []  Hold pending physician visit  []  Discharge    Time In 0700   Time Out 0728   Timed Code Minutes: 28   Total Treatment Time: 28     Electronically Signed by: Darwin Coe PT

## 2021-10-04 ENCOUNTER — HOSPITAL ENCOUNTER (OUTPATIENT)
Dept: PHYSICAL THERAPY | Age: 46
Setting detail: THERAPIES SERIES
Discharge: HOME | End: 2021-10-04
Payer: COMMERCIAL

## 2021-10-04 PROCEDURE — 97110 THERAPEUTIC EXERCISES: CPT

## 2021-10-04 NOTE — PROGRESS NOTES
[address] [facility]  PHYSICAL THERAPY  [] EVALUATION  [x] DAILY NOTE (LAND) [] DAILY NOTE (AQUATIC ) [] PROGRESS NOTE [] DISCHARGE NOTE    [x] OUTPATIENT REHABILITATION [facility]   [] [place] [address]    [] [facility]   [] Lester Mack    Date: 10/4/2021  Patient Name:  Rickard Goodell  : 1975  MRN: [id number]  CSN: [id number]    Referring Practitioner Juno Hong DO   Diagnosis Sprain of calcaneofibular ligament of left ankle, initial encounter [S93.412A]    Treatment Diagnosis Significant swelling and pain left lateral ankle with difficulty walking, standing, AROM , transfers sit <>stand   Date of Evaluation 21    Additional Pertinent History HTN      Functional Outcome Measure Used FAA   Functional Outcome Score  (21)       Insurance: Primary: Payor: UMR /  /  / ,   Secondary:    Authorization Information: PRE CERTIFICATION REQUIRED: NO  INSURANCE THERAPY BENEFIT:  ALLOWED 60 VISTIS PT/OT/ST COMBINED PER CALENDAR YEAR. AQUATIC THERAPY COVERED: YES  MODALITIES COVERED:  YES, YES MASSAGE  TELEHEALTH COVERED: YES  REFERENCE NUMBER: [id number]   Visit # 4, 4/10 for progress note   Visits Allowed: 60 visits   Recertification Date: 647   Physician Follow-Up: Call following PT appointment for follow up. Has not followed with orthopedist.    Physician Orders:    History of Present Illness: Patient went to ER 2021 when she slipped and fell from a puddle of water. Noted swelling and bruising immediately with pain at her left ankle. Xrays have revealed no fracture or dislocation of ankle. SUBJECTIVE: Patient continues to have swelling and pain at left ankle. Notes soreness at left knee if walking too much. KT tape did not stay on after last session. TREATMENT   Precautions: Significant swelling and pain at left lateral ankle region. Tenderness and limited AROM due to pain. Tissue shortening.     Pain: 7/10 left ankle and 6/10 left knee pain    X in shaded column indicates activity completed today   Modalities Parameters/  Location  Notes   Cold pack to patient's L ankle  x5 min   At end of session. Patient R sidelying with pillow under leg of support. Educated on performing at home today. Manual Therapy Time/Technique  Notes   STM to patient's L calf region with patient R sidelying and pillow between knees x8 min   Performed to assist with decreasing tightness to assist with increasing mobility. She reported a \"burning and aching\" sensation at times during   Kinesiotape for lateral ankle sprain figure eight. Page 224 in KT book  x          Exercise/Intervention   Notes   Active ankle dorsiflexion and plantarflexion 10x  x    Instruction in ice and elevation, Continue to wear ortho boot on LLE. Call physician regarding further follow up regarding left ankle and left knee. Seated heel/toe raises 10x  x    Seated gentle gastroc stretch with strap 3x 10 sec  x    Seated small disk with 1/2 croquet ball taps (front/back)  15x  x    Seated L quad set with foot on stool 10x 5 sec   Noted some medial knee pain           Standing open chain ex: LLE hip flexion, hip abduction and extension. 10x  x Did not tolerate closed chain ex LLE due to ankle pain and knee pain so did open chain ex in standing   LAQs painfree range 10x   x           2 way ankle resistance band pf/df 10x   x                         Discussed level up for right shoe    x Patient plans to order it. Specific Interventions Next Treatment: ice, possible Dry needling for left lateral ankle sprain, AROM left ankle beginning with ankle df/pf only. Discuss with doctor left knee consult/evaluation, left ankle orthopedist visit and further testing.      Activity/Treatment Tolerance:  []  Patient tolerated treatment well  []  Patient limited by fatigue  [x]  Patient limited by pain   []  Patient limited by medical complications  [x]  Other: left knee pain as well with limited ROM, strength    Assessment: KT tape figure 8 for left lateral ankle sprain. Added light peach resistance band for ankle pf/df today. Continues to be limited due to pain at left lateral ankle. Ex modified accordingly. Limited progress. Ankle active ROM did improve to 5 degrees past neutral. Swelling continues at lateral ankle. Body Structures/Functions/Activity Limitations: edema, impaired activity tolerance, impaired balance, impaired ROM, impaired strength, pain and abnormal gait  Prognosis: fair    GOALS:  Patient Goal: To be able to return to work without left ankle pain, swelling, and improved walking tolerance. Also to have less left knee pain. Short Term Goals: 4 weeks  1. Patient to report of decreased pain levels left lateral ankle with standing, walking, and AROM  from 7/10 to 3/10   2. Patient to demonstrate increased left ankle ROM in dorsiflexion from -10 degrees from neutral dorsiflexion to 10 degrees past neutral dorsiflexion in order to have improved ankle motion and progression with ex to further ROM ex at left ankle  3. Patient to tolerate MMT without ankle pain with strength 3-/5 to 4/5 with improved stability through ankle without pain and improved strength with walking, standing statically. 4. Patient to be able to narrow stance with eyes open with equal wt shift through right/left LEs. Tandem stance for 45 seconds r/l without sway when able to be further assessed. Long Term Goals: 8 weeks  1. FAAM 21/84 to 40/84  2. Independent in HEP with decreased pain, improved ROM, strength with progressing improvement with functional mobility with walking,standing, transfers. Patient Education: Monitor for any redness or irritation from tape. Remove if any irritation. Wear for 4 days as tolerated. [x]  HEP/Education Completed: Continue with icing her ankle at home and completing her exercises.        [employer] Access Code:  []  No new Education completed  [x] Reviewed Prior HEP      [x]  Patient verbalized and/or demonstrated understanding of education provided. []  Patient unable to verbalize and/or demonstrate understanding of education provided. Will continue education. []  Barriers to learning:     PLAN:  Treatment Recommendations: Strengthening, Range of Motion, Balance Training, Functional Mobility Training, Gait Training, Stair Training, Neuromuscular Re-education, Manual Therapy - Soft Tissue Mobilization, Pain Management, Home Exercise Program, Patient Education, Integrative Dry Needling, Aquatics and Modalities    []  Plan of care initiated. Plan to see patient 2 times per week for 8 weeks to address the treatment planned outlined above. [x]  Continue with current plan of care  []  Modify plan of care as follows: Follow up with physician for evaluation of left knee and possible further consult with ortho on left ankle sprain.    []  Hold pending physician visit  []  Discharge    Time In 1300   Time Out 1326   Timed Code Minutes: 26   Total Treatment Time: 26     Electronically Signed by: Alo Em, PT

## 2021-10-08 ENCOUNTER — HOSPITAL ENCOUNTER (OUTPATIENT)
Dept: PHYSICAL THERAPY | Age: 46
Setting detail: THERAPIES SERIES
Discharge: HOME | End: 2021-10-08
Payer: COMMERCIAL

## 2021-10-08 PROCEDURE — 97110 THERAPEUTIC EXERCISES: CPT

## 2021-10-08 NOTE — PROGRESS NOTES
[address] [facility]  PHYSICAL THERAPY  [] EVALUATION  [x] DAILY NOTE (LAND) [] DAILY NOTE (AQUATIC ) [] PROGRESS NOTE [] DISCHARGE NOTE    [x] OUTPATIENT REHABILITATION CENTER [facility]   [] [place] [address]    [] [facility]   [] Dimple Portillo    Date: 10/8/2021  Patient Name:  Anthony Barcenas  : 1975  MRN: [id number]  CSN: [id number]    Referring Practitioner Bishnu Aiken DO   Diagnosis Sprain of calcaneofibular ligament of left ankle, initial encounter [S93.412A]    Treatment Diagnosis Significant swelling and pain left lateral ankle with difficulty walking, standing, AROM , transfers sit <>stand   Date of Evaluation 21    Additional Pertinent History HTN      Functional Outcome Measure Used FAA   Functional Outcome Score  (21)       Insurance: Primary: Payor: UMR /  /  / ,   Secondary:    Authorization Information: PRE CERTIFICATION REQUIRED: NO  INSURANCE THERAPY BENEFIT:  ALLOWED 60 VISTIS PT/OT/ST COMBINED PER CALENDAR YEAR. AQUATIC THERAPY COVERED: YES  MODALITIES COVERED:  YES, YES MASSAGE  TELEHEALTH COVERED: YES  REFERENCE NUMBER: [id number]   Visit # 5, 5/10 for progress note   Visits Allowed: 60 visits   Recertification Date:    Physician Follow-Up: Call following PT appointment for follow up. Has not followed with orthopedist.    Physician Orders:    History of Present Illness: Patient went to ER 2021 when she slipped and fell from a puddle of water. Noted swelling and bruising immediately with pain at her left ankle. Xrays have revealed no fracture or dislocation of ankle. SUBJECTIVE: Patient reporting of pain level 6/10 at left lateral ankle. Noting continued swelling and pain. KT tape made the ankle feel more stable but came off in shower. She is to  ankle support at [facility]. TREATMENT   Precautions: Significant swelling and pain at left lateral ankle region. Tenderness and limited AROM due to pain. Tissue shortening. Pain: 6/10 left ankle and 6/10 left knee pain    X in shaded column indicates activity completed today   Modalities Parameters/  Location  Notes   Cold pack to patient's L ankle  x5 min   At end of session. Patient R sidelying with pillow under leg of support. Educated on performing at home today. Manual Therapy Time/Technique  Notes   STM to patient's L calf region with patient R sidelying and pillow between knees x8 min   Performed to assist with decreasing tightness to assist with increasing mobility. She reported a \"burning and aching\" sensation at times during   Kinesiotape for lateral ankle sprain figure eight. Page 224 in KT book  x          Exercise/Intervention   Notes   Active ankle dorsiflexion and plantarflexion 15x  x    Instruction in ice and elevation, Continue to wear ortho boot on LLE. Call physician regarding further follow up regarding left ankle and left knee. Seated heel/toe raises 15x  x    Seated gentle gastroc stretch with strap 5x 10 sec  x    Seated small disk with 1/2 croquet ball taps (front/back)  15x  x    Seated L quad set with foot on stool 15x 5 sec  x Noted some medial knee pain           Standing open chain ex: LLE hip flexion, hip abduction and extension. 10x  x Did not tolerate closed chain ex LLE due to ankle pain and knee pain so did open chain ex in standing   LAQs painfree range 12x   x           2 way ankle resistance band pf/df 10x   x                         Discussed level up for right shoe     Patient plans to order it. Specific Interventions Next Treatment: ice, possible Dry needling for left lateral ankle sprain, AROM left ankle beginning with ankle df/pf only. Discuss with doctor left knee consult/evaluation, left ankle orthopedist visit and further testing.      Activity/Treatment Tolerance:  []  Patient tolerated treatment well  []  Patient limited by fatigue  [x]  Patient limited by pain   []  Patient limited by medical complications  [x]  Other: left knee pain as well with limited ROM, strength    Assessment: KT tape figure 8 for left lateral ankle sprain. Increased reps as tolerated. Tolerated resistance band for ankle PF/DF better today. Noted able to wb a little more through LLE in standing but could not tolerate Closed chain ex yet. Pain level increased to 8/10 by end of session. Body Structures/Functions/Activity Limitations: edema, impaired activity tolerance, impaired balance, impaired ROM, impaired strength, pain and abnormal gait  Prognosis: fair    GOALS:  Patient Goal: To be able to return to work without left ankle pain, swelling, and improved walking tolerance. Also to have less left knee pain. Short Term Goals: 4 weeks  1. Patient to report of decreased pain levels left lateral ankle with standing, walking, and AROM  from 7/10 to 3/10   2. Patient to demonstrate increased left ankle ROM in dorsiflexion from -10 degrees from neutral dorsiflexion to 10 degrees past neutral dorsiflexion in order to have improved ankle motion and progression with ex to further ROM ex at left ankle  3. Patient to tolerate MMT without ankle pain with strength 3-/5 to 4/5 with improved stability through ankle without pain and improved strength with walking, standing statically. 4. Patient to be able to narrow stance with eyes open with equal wt shift through right/left LEs. Tandem stance for 45 seconds r/l without sway when able to be further assessed. Long Term Goals: 8 weeks  1. FAAM 21/84 to 40/84  2. Independent in HEP with decreased pain, improved ROM, strength with progressing improvement with functional mobility with walking,standing, transfers. Patient Education: Monitor for any redness or irritation from tape. Remove if any irritation. Wear for 4 days as tolerated. Continue HEP. [x]  HEP/Education Completed: Continue with icing her ankle at home and completing her exercises.       [employer] 16 Code:  []  No new Education completed  [x]  Reviewed Prior HEP      [x]  Patient verbalized and/or demonstrated understanding of education provided. []  Patient unable to verbalize and/or demonstrate understanding of education provided. Will continue education. []  Barriers to learning:     PLAN:  Treatment Recommendations: Strengthening, Range of Motion, Balance Training, Functional Mobility Training, Gait Training, Stair Training, Neuromuscular Re-education, Manual Therapy - Soft Tissue Mobilization, Pain Management, Home Exercise Program, Patient Education, Integrative Dry Needling, Aquatics and Modalities    []  Plan of care initiated. Plan to see patient 2 times per week for 8 weeks to address the treatment planned outlined above. [x]  Continue with current plan of care  []  Modify plan of care as follows: Follow up with physician for evaluation of left knee and possible further consult with ortho on left ankle sprain.    []  Hold pending physician visit  []  Discharge    Time In 0920   Time Out 0950   Timed Code Minutes: 30   Total Treatment Time: 30     Electronically Signed by: Behzad Mascorro PT

## 2021-10-13 ENCOUNTER — HOSPITAL ENCOUNTER (OUTPATIENT)
Dept: PHYSICAL THERAPY | Age: 46
Setting detail: THERAPIES SERIES
Discharge: HOME | End: 2021-10-13
Payer: COMMERCIAL

## 2021-10-13 PROCEDURE — 97110 THERAPEUTIC EXERCISES: CPT

## 2021-10-13 NOTE — PROGRESS NOTES
[address] [facility]  PHYSICAL THERAPY  [] EVALUATION  [x] DAILY NOTE (LAND) [] DAILY NOTE (AQUATIC ) [] PROGRESS NOTE [] DISCHARGE NOTE    [x] OUTPATIENT REHABILITATION [facility]   [] [place] [address]    [] [facility]   [] Ami Venegas    Date: 10/13/2021  Patient Name:  Katie Tavarez  : 1975  MRN: [id number]  CSN: [id number]    Referring Practitioner Dearl DO Sabina   Diagnosis Sprain of calcaneofibular ligament of left ankle, initial encounter [S93.412A]    Treatment Diagnosis Significant swelling and pain left lateral ankle with difficulty walking, standing, AROM , transfers sit <>stand   Date of Evaluation 21    Additional Pertinent History HTN      Functional Outcome Measure Used FAA   Functional Outcome Score  (21)       Insurance: Primary: Payor: UMR /  /  / ,   Secondary:    Authorization Information: PRE CERTIFICATION REQUIRED: NO  INSURANCE THERAPY BENEFIT:  ALLOWED 60 VISTIS PT/OT/ST COMBINED PER CALENDAR YEAR. AQUATIC THERAPY COVERED: YES  MODALITIES COVERED:  YES, YES MASSAGE  TELEHEALTH COVERED: YES  REFERENCE NUMBER: [id number]   Visit # 7, 7/10 for progress note   Visits Allowed: 60 visits   Recertification Date:    Physician Follow-Up: Call following PT appointment for follow up. Has not followed with orthopedist.    Physician Orders:    History of Present Illness: Patient went to ER 2021 when she slipped and fell from a puddle of water. Noted swelling and bruising immediately with pain at her left ankle. Xrays have revealed no fracture or dislocation of ankle. SUBJECTIVE: Patient states she continues to have left lateral ankle pain and rates current pain 6/10 along with swelling. KT tape made the ankle feel more stable and stayed on longer. Patient picked up ankle support at [facility] and is wearing with boot. Patient plans to transition to wearing with tennis shoe. Patient reports compliance with HEP. TREATMENT   Precautions: Significant swelling and pain at left lateral ankle region. Tenderness and limited AROM due to pain. Tissue shortening. Pain: 6/10 left ankle and 6/10 left knee pain    X in shaded column indicates activity completed today   Modalities Parameters/  Location  Notes   Cold pack to patient's L ankle  x5 min   At end of session. Patient R sidelying with pillow under leg of support. Educated on performing at home today. Manual Therapy Time/Technique  Notes   STM to patient's L calf region with patient R sidelying and pillow between knees x8 min   Performed to assist with decreasing tightness to assist with increasing mobility. She reported a \"burning and aching\" sensation at times during   Kinesiotape for L lateral ankle sprain figure eight and for L patella Page 224 in KT book  x Used Rock tape today         Exercise/Intervention   Notes   Active ankle dorsiflexion and plantarflexion 15x  x    Instruction in ice and elevation, Continue to wear ortho boot on LLE. Call physician regarding further follow up regarding left ankle and left knee. Seated heel/toe raises 15x  x    Seated gentle gastroc stretch with strap 5x 10 sec  x    Seated small disk with 1/2 croquet ball taps (front/back)  15x  x    Seated L quad set with foot on stool 15x 5 sec  x Noted some medial knee pain           Standing open chain ex: LLE hip flexion, hip abduction and extension. 10x  x Did not tolerate closed chain ex LLE due to ankle pain and knee pain so did open chain ex in standing   LAQs painfree range 15x   x           2 way ankle resistance band pf/df 10x   x peach                        Discussed level up for right shoe     Patient plans to order it. Specific Interventions Next Treatment: ice, possible Dry needling for left lateral ankle sprain, AROM left ankle beginning with ankle df/pf only.  Discuss with doctor left knee consult/evaluation, left ankle orthopedist visit and further testing. Activity/Treatment Tolerance:  []  Patient tolerated treatment well  []  Patient limited by fatigue  [x]  Patient limited by pain   []  Patient limited by medical complications  [x]  Other: left knee pain as well with limited ROM, strength    Assessment: Patient completed exercises as listed above working on ROM and strength. Increased reps with some exercises to progress. Patient received Rock tape figure 8 for L lateral ankle sprain and along L med patella. Patient instructed to bring tennis shoes next visit and may attempt standing CKC exercises with ASO. GOALS:  Patient Goal: To be able to return to work without left ankle pain, swelling, and improved walking tolerance. Also to have less left knee pain. Short Term Goals: 4 weeks  1. Patient to report of decreased pain levels left lateral ankle with standing, walking, and AROM  from 7/10 to 3/10   2. Patient to demonstrate increased left ankle ROM in dorsiflexion from -10 degrees from neutral dorsiflexion to 10 degrees past neutral dorsiflexion in order to have improved ankle motion and progression with ex to further ROM ex at left ankle  3. Patient to tolerate MMT without ankle pain with strength 3-/5 to 4/5 with improved stability through ankle without pain and improved strength with walking, standing statically. 4. Patient to be able to narrow stance with eyes open with equal wt shift through right/left LEs. Tandem stance for 45 seconds r/l without sway when able to be further assessed. Long Term Goals: 8 weeks  1. FAAM 21/84 to 40/84  2. Independent in HEP with decreased pain, improved ROM, strength with progressing improvement with functional mobility with walking,standing, transfers. Patient Education: Monitor for any redness or irritation from tape. Remove if any irritation. Wear for 4 days as tolerated. Continue HEP.   [x]  HEP/Education Completed: Continue with icing her ankle at home and completing her exercises.  [employer] Access Code:  []  No new Education completed  [x]  Reviewed Prior HEP      [x]  Patient verbalized and/or demonstrated understanding of education provided. []  Patient unable to verbalize and/or demonstrate understanding of education provided. Will continue education. []  Barriers to learning:     PLAN:  Treatment Recommendations: Strengthening, Range of Motion, Balance Training, Functional Mobility Training, Gait Training, Stair Training, Neuromuscular Re-education, Manual Therapy - Soft Tissue Mobilization, Pain Management, Home Exercise Program, Patient Education, Integrative Dry Needling, Aquatics and Modalities    []  Plan of care initiated. Plan to see patient 2 times per week for 8 weeks to address the treatment planned outlined above. [x]  Continue with current plan of care  []  Modify plan of care as follows: Follow up with physician for evaluation of left knee and possible further consult with ortho on left ankle sprain.    []  Hold pending physician visit  []  Discharge    Time In 0905   Time Out 0935   Timed Code Minutes: 30 min   Total Treatment Time: 30 min     Electronically Signed by: Lolita Martinez PTA

## 2021-10-15 ENCOUNTER — HOSPITAL ENCOUNTER (OUTPATIENT)
Dept: PHYSICAL THERAPY | Age: 46
Setting detail: THERAPIES SERIES
Discharge: HOME | End: 2021-10-15
Payer: COMMERCIAL

## 2021-10-15 PROCEDURE — 97110 THERAPEUTIC EXERCISES: CPT

## 2021-10-15 NOTE — PROGRESS NOTES
[address] [facility]  PHYSICAL THERAPY  [] EVALUATION  [x] DAILY NOTE (LAND) [] DAILY NOTE (AQUATIC ) [] PROGRESS NOTE [] DISCHARGE NOTE    [x] OUTPATIENT REHABILITATION [facility]   [] [place] [address]    [] [facility]   [] [place]    Date: 10/15/2021  Patient Name:  Christie John  : 1975  MRN: [id number]  CSN: [id number]    Referring Practitioner Leticia Jhaveri DO   Diagnosis Sprain of calcaneofibular ligament of left ankle, initial encounter [S93.412A]    Treatment Diagnosis Significant swelling and pain left lateral ankle with difficulty walking, standing, AROM , transfers sit <>stand   Date of Evaluation 21    Additional Pertinent History HTN      Functional Outcome Measure Used FAA   Functional Outcome Score  (21)       Insurance: Primary: Payor: UMR /  /  / ,   Secondary:    Authorization Information: PRE CERTIFICATION REQUIRED: NO  INSURANCE THERAPY BENEFIT:  ALLOWED 60 VISTIS PT/OT/ST COMBINED PER CALENDAR YEAR. AQUATIC THERAPY COVERED: YES  MODALITIES COVERED:  YES, YES MASSAGE  TELEHEALTH COVERED: YES  REFERENCE NUMBER: [id number]   Visit # 7, 7/10 for progress note   Visits Allowed: 60 visits   Recertification Date:    Physician Follow-Up: Call following PT appointment for follow up. Has not followed with orthopedist.    Physician Orders:    History of Present Illness: Patient went to ER 2021 when she slipped and fell from a puddle of water. Noted swelling and bruising immediately with pain at her left ankle. Xrays have revealed no fracture or dislocation of ankle. SUBJECTIVE: Patient reports 5/10 pain to this date. States KT tape helped alleviate some pain and lasted a couple days after treatment session. Felt fine after previous treatment session. TREATMENT   Precautions: Significant swelling and pain at left lateral ankle region. Tenderness and limited AROM due to pain. Tissue shortening.     Pain: 6/10 left ankle and 6/10 left knee pain    X in shaded column indicates activity completed today   Modalities Parameters/  Location  Notes   Cold pack to patient's L ankle  x5 min   At end of session. Patient R sidelying with pillow under leg of support. Educated on performing at home today. Manual Therapy Time/Technique  Notes   STM to patient's L calf region with patient R sidelying and pillow between knees x8 min   Performed to assist with decreasing tightness to assist with increasing mobility. She reported a \"burning and aching\" sensation at times during   Kinesiotape for L lateral ankle sprain figure eight and for L patella Page 224 in KT book  x Used Rock tape today         Exercise/Intervention   Notes   Active ankle dorsiflexion and plantarflexion 15x  X    Instruction in ice and elevation, Continue to wear ortho boot on LLE. Call physician regarding further follow up regarding left ankle and left knee. Seated heel/toe raises 15x  X    Seated gentle gastroc stretch with strap 5x 10 sec  X    Seated small disk with 1/2 croquet ball taps (front/back)  15x  X    Seated L quad set with foot on stool 15x 5 sec   Noted some medial knee pain           Standing open chain ex: LLE hip flexion, hip abduction and extension. 10x   Did not tolerate closed chain ex LLE due to ankle pain and knee pain so did open chain ex in standing   LAQs painfree range 15x   x           2 way ankle resistance band pf/df 10x   x peach                        Discussed level up for right shoe     Patient plans to order it. Specific Interventions Next Treatment: ice, possible Dry needling for left lateral ankle sprain, AROM left ankle beginning with ankle df/pf only. Discuss with doctor left knee consult/evaluation, left ankle orthopedist visit and further testing.      Activity/Treatment Tolerance:  []  Patient tolerated treatment well  []  Patient limited by fatigue  [x]  Patient limited by pain   []  Patient limited by medical complications  [x]  Other: left knee pain as well with limited ROM, strength    Assessment: Patient completed exercises as listed above working on ROM and strength. Increase in pain noted with DF while using peach band. Unable to progress to standing therex as patients ASO did not fit inside her tennis shoe. Patient stated 6/10 pain at the conclusion of treatment session. GOALS:  Patient Goal: To be able to return to work without left ankle pain, swelling, and improved walking tolerance. Also to have less left knee pain. Short Term Goals: 4 weeks  1. Patient to report of decreased pain levels left lateral ankle with standing, walking, and AROM  from 7/10 to 3/10   2. Patient to demonstrate increased left ankle ROM in dorsiflexion from -10 degrees from neutral dorsiflexion to 10 degrees past neutral dorsiflexion in order to have improved ankle motion and progression with ex to further ROM ex at left ankle  3. Patient to tolerate MMT without ankle pain with strength 3-/5 to 4/5 with improved stability through ankle without pain and improved strength with walking, standing statically. 4. Patient to be able to narrow stance with eyes open with equal wt shift through right/left LEs. Tandem stance for 45 seconds r/l without sway when able to be further assessed. Long Term Goals: 8 weeks  1. FAAM 21/84 to 40/84  2. Independent in HEP with decreased pain, improved ROM, strength with progressing improvement with functional mobility with walking,standing, transfers. Patient Education: Monitor for any redness or irritation from tape. Remove if any irritation. Wear for 4 days as tolerated. Continue HEP. [x]  HEP/Education Completed: Continue with icing her ankle at home and completing her exercises.  [employer] Access Code:  []  No new Education completed  [x]  Reviewed Prior HEP      [x]  Patient verbalized and/or demonstrated understanding of education provided.   []  Patient unable to verbalize and/or demonstrate understanding of education provided. Will continue education. []  Barriers to learning:     PLAN:  Treatment Recommendations: Strengthening, Range of Motion, Balance Training, Functional Mobility Training, Gait Training, Stair Training, Neuromuscular Re-education, Manual Therapy - Soft Tissue Mobilization, Pain Management, Home Exercise Program, Patient Education, Integrative Dry Needling, Aquatics and Modalities    []  Plan of care initiated. Plan to see patient 2 times per week for 8 weeks to address the treatment planned outlined above. [x]  Continue with current plan of care  []  Modify plan of care as follows: Follow up with physician for evaluation of left knee and possible further consult with ortho on left ankle sprain.    []  Hold pending physician visit  []  Discharge    Time In 1020   Time Out 1045   Timed Code Minutes: 25   Total Treatment Time: 25     Electronically Signed by: Colette Timmons PTA

## 2021-10-18 ENCOUNTER — HOSPITAL ENCOUNTER (OUTPATIENT)
Dept: PHYSICAL THERAPY | Age: 46
Setting detail: THERAPIES SERIES
Discharge: HOME | End: 2021-10-18
Payer: COMMERCIAL

## 2021-10-18 PROCEDURE — 97110 THERAPEUTIC EXERCISES: CPT

## 2021-10-18 NOTE — PROGRESS NOTES
[address] [facility]  PHYSICAL THERAPY  [] EVALUATION  [x] DAILY NOTE (LAND) [] DAILY NOTE (AQUATIC ) [] PROGRESS NOTE [] DISCHARGE NOTE    [x] OUTPATIENT REHABILITATION [facility]   [] [place] [address]    [] [facility]   [] Lester Mack    Date: 10/18/2021  Patient Name:  Rickard Goodell  : 1975  MRN: [id number]  CSN: [id number]    Referring Practitioner Juno Hong DO   Diagnosis Sprain of calcaneofibular ligament of left ankle, initial encounter [S93.412A]    Treatment Diagnosis Significant swelling and pain left lateral ankle with difficulty walking, standing, AROM , transfers sit <>stand   Date of Evaluation 21    Additional Pertinent History HTN      Functional Outcome Measure Used FAA   Functional Outcome Score  (21)       Insurance: Primary: Payor: UMR /  /  / ,   Secondary:    Authorization Information: PRE CERTIFICATION REQUIRED: NO  INSURANCE THERAPY BENEFIT:  ALLOWED 60 VISTIS PT/OT/ST COMBINED PER CALENDAR YEAR. AQUATIC THERAPY COVERED: YES  MODALITIES COVERED:  YES, YES MASSAGE  TELEHEALTH COVERED: YES  REFERENCE NUMBER: [id number]   Visit # 8, 8/10 for progress note   Visits Allowed: 60 visits   Recertification Date:    Physician Follow-Up: Call following PT appointment for follow up. Has not followed with orthopedist.    Physician Orders:    History of Present Illness: Patient went to ER 2021 when she slipped and fell from a puddle of water. Noted swelling and bruising immediately with pain at her left ankle. Xrays have revealed no fracture or dislocation of ankle. SUBJECTIVE: Patient states she notices some improvement with pain in L ankle and rates current pain 4.5/10. Notices more pain along L knee 5/10. States KT tape helped alleviate some pain and lasted a couple days after treatment session. TREATMENT   Precautions: Significant swelling and pain at left lateral ankle region.  Tenderness and limited AROM due to pain. Tissue shortening. Pain: 4.5/10 left ankle and 5/10 left knee pain    X in shaded column indicates activity completed today   Modalities Parameters/  Location  Notes   Cold pack to patient's L ankle  x5 min   At end of session. Patient R sidelying with pillow under leg of support. Educated on performing at home today. Manual Therapy Time/Technique  Notes   STM to patient's L calf region with patient R sidelying and pillow between knees x8 min   Performed to assist with decreasing tightness to assist with increasing mobility. She reported a \"burning and aching\" sensation at times during   Kinesiotape for L lateral ankle sprain figure eight and for L patella Page 224 in KT book  x Used Rock tape today         Exercise/Intervention   Notes   Active ankle dorsiflexion and plantarflexion 15x  X    Instruction in ice and elevation, Continue to wear ortho boot on LLE. Call physician regarding further follow up regarding left ankle and left knee. Seated heel/toe raises 15x  X    Seated gentle gastroc stretch with strap 5x 10 sec  X    Seated small disk with 1/2 croquet ball taps (front/back)  15x  X    Seated L quad set with foot on stool 15x 5 sec   Noted some medial knee pain           Standing open chain ex: LLE hip flexion, hip abduction and extension. 10x   Did not tolerate closed chain ex LLE due to ankle pain and knee pain so did open chain ex in standing   LAQs painfree range 15x   x           2 way ankle resistance band pf/df 10x   x peach          Standing with ASO donned:        marches 10x  x    H/T raises 10x  x    rockerboard F/B and balance, side and balance 10x 2x 10 sec x    Hip 3 way                     Discussed level up for right shoe     Patient plans to order it. Specific Interventions Next Treatment: ice, possible Dry needling for left lateral ankle sprain, AROM left ankle beginning with ankle df/pf only.  Discuss with doctor left knee consult/evaluation, left ankle orthopedist visit and further testing. Activity/Treatment Tolerance:  []  Patient tolerated treatment well  []  Patient limited by fatigue  [x]  Patient limited by pain   []  Patient limited by medical complications  [x]  Other: left knee pain as well with limited ROM, strength    Assessment: Patient scheduled for 30 min treatment session and arrived 5 min late today. Patient completed exercises as listed above working on ROM and strength. Patient did bring in different pair of tennis that the ASO was able to fit in. Progressed with standing closed chain exercises without complaints of increased pain. Patient received Kinesiotape for L lateral ankle sprain figure eight and for L patella. GOALS:  Patient Goal: To be able to return to work without left ankle pain, swelling, and improved walking tolerance. Also to have less left knee pain. Short Term Goals: 4 weeks  1. Patient to report of decreased pain levels left lateral ankle with standing, walking, and AROM  from 7/10 to 3/10   2. Patient to demonstrate increased left ankle ROM in dorsiflexion from -10 degrees from neutral dorsiflexion to 10 degrees past neutral dorsiflexion in order to have improved ankle motion and progression with ex to further ROM ex at left ankle  3. Patient to tolerate MMT without ankle pain with strength 3-/5 to 4/5 with improved stability through ankle without pain and improved strength with walking, standing statically. 4. Patient to be able to narrow stance with eyes open with equal wt shift through right/left LEs. Tandem stance for 45 seconds r/l without sway when able to be further assessed. Long Term Goals: 8 weeks  1. FAAM 21/84 to 40/84  2. Independent in HEP with decreased pain, improved ROM, strength with progressing improvement with functional mobility with walking,standing, transfers. Patient Education: Monitor for any redness or irritation from tape.  Remove if any irritation. Wear for 4 days as tolerated. Continue HEP. [x]  HEP/Education Completed: Continue with icing her ankle at home and completing her exercises.  [employer] Access Code:  []  No new Education completed  [x]  Reviewed Prior HEP      [x]  Patient verbalized and/or demonstrated understanding of education provided. []  Patient unable to verbalize and/or demonstrate understanding of education provided. Will continue education. []  Barriers to learning:     PLAN:  Treatment Recommendations: Strengthening, Range of Motion, Balance Training, Functional Mobility Training, Gait Training, Stair Training, Neuromuscular Re-education, Manual Therapy - Soft Tissue Mobilization, Pain Management, Home Exercise Program, Patient Education, Integrative Dry Needling, Aquatics and Modalities    []  Plan of care initiated. Plan to see patient 2 times per week for 8 weeks to address the treatment planned outlined above. [x]  Continue with current plan of care  []  Modify plan of care as follows: Follow up with physician for evaluation of left knee and possible further consult with ortho on left ankle sprain.    []  Hold pending physician visit  []  Discharge    Time In 0950   Time Out 1015   Timed Code Minutes: 25 min   Total Treatment Time: 25 min     Electronically Signed by: Ariel Pichardo PTA

## 2021-10-20 ENCOUNTER — HOSPITAL ENCOUNTER (OUTPATIENT)
Dept: PHYSICAL THERAPY | Age: 46
Setting detail: THERAPIES SERIES
Discharge: HOME | End: 2021-10-20
Payer: COMMERCIAL

## 2021-10-20 PROCEDURE — 97110 THERAPEUTIC EXERCISES: CPT

## 2021-10-20 NOTE — PROGRESS NOTES
[address] [facility]  PHYSICAL THERAPY  [] EVALUATION  [x] DAILY NOTE (LAND) [] DAILY NOTE (AQUATIC ) [] PROGRESS NOTE [] DISCHARGE NOTE    [x] OUTPATIENT REHABILITATION CENTER [facility]   [] [place] [address]    [] [facility]   [] Lakeshia Stewart    Date: 10/20/2021  Patient Name:  John Richter  : 1975  MRN: [id number]  CSN: [id number]    Referring Practitioner Alea Alatorre DO   Diagnosis Sprain of calcaneofibular ligament of left ankle, initial encounter [S93.412A]    Treatment Diagnosis Significant swelling and pain left lateral ankle with difficulty walking, standing, AROM , transfers sit <>stand   Date of Evaluation 21    Additional Pertinent History HTN      Functional Outcome Measure Used FAA   Functional Outcome Score  (21)       Insurance: Primary: Payor: UMR /  /  / ,   Secondary:    Authorization Information: PRE CERTIFICATION REQUIRED: NO  INSURANCE THERAPY BENEFIT:  ALLOWED 60 VISTIS PT/OT/ST COMBINED PER CALENDAR YEAR. AQUATIC THERAPY COVERED: YES  MODALITIES COVERED:  YES, YES MASSAGE  TELEHEALTH COVERED: YES  REFERENCE NUMBER: [id number]   Visit # 9, 0/10 for progress note   Visits Allowed: 60 visits   Recertification Date:    Physician Follow-Up: Call following PT appointment for follow up. Has not followed with orthopedist.    Physician Orders:    History of Present Illness: Patient went to ER 2021 when she slipped and fell from a puddle of water. Noted swelling and bruising immediately with pain at her left ankle. Xrays have revealed no fracture or dislocation of ankle. SUBJECTIVE: Patient now wearing ASO support on left ankle instead of wearing boot. Has been out of boot for 1 day and 1/2. Will wear boot if going to be in community shopping/or on her feet for awhile. Notes left knee and ankle are tender to touch yet. TREATMENT   Precautions: Significant swelling and pain at left lateral ankle region. Tenderness and limited AROM due to pain. Tissue shortening. Pain: 4/10 left ankle and 4/10 left knee pain    X in shaded column indicates activity completed today   Modalities Parameters/  Location  Notes   Cold pack to patient's L ankle  x5 min   At end of session. Patient R sidelying with pillow under leg of support. Educated on performing at home today. Manual Therapy Time/Technique  Notes   STM to patient's L calf region with patient R sidelying and pillow between knees x8 min   Performed to assist with decreasing tightness to assist with increasing mobility. She reported a \"burning and aching\" sensation at times during   Kinesiotape for L lateral ankle sprain figure eight and for L patella Page 224 in KT book  x Used Rock tape today         Exercise/Intervention   Notes   Active ankle dorsiflexion and plantarflexion 15x  X    Instruction in ice and elevation, Continue to wear ortho boot on LLE. Call physician regarding further follow up regarding left ankle and left knee. Seated heel/toe raises 15x  X    Seated gentle gastroc stretch with strap 5x 10 sec  X    Seated small disk with 1/2 croquet ball taps (front/back), Cw/CCW  15x  X    Seated L quad set with foot on stool 15x 5 sec   Noted some medial knee pain                   LAQs painfree range 15x   x           2 way ankle resistance band pf/df 15x   x peach          Standing with ASO donned:        marches 10x  x    H/T raises 10x  x    rockerboard F/B and balance, side and balance 10x 2x 10 sec x    Hip 3 way open chain through LLE  10x  x                                                                          Discussed level up for right shoe     Patient plans to order it. Specific Interventions Next Treatment: ice, possible Dry needling for left lateral ankle sprain, AROM left ankle beginning with ankle df/pf only. Discuss with doctor left knee consult/evaluation, left ankle orthopedist visit and further testing. Activity/Treatment Tolerance:  []  Patient tolerated treatment well  []  Patient limited by fatigue  [x]  Patient limited by pain   []  Patient limited by medical complications  [x]  Other: left knee pain as well with limited ROM, strength    Assessment: Increased reps with resistance band and added cw/ccw with mini disc while seated. ROM improving to 5 degrees past neutral active df. Noting improved wb through LLE with ambulation and standing. Weaning out of ortho boot and wearing ASO now. Patient to be progressed to balance with step stance with hydrostick next session. GOALS:  Patient Goal: To be able to return to work without left ankle pain, swelling, and improved walking tolerance. Also to have less left knee pain. Short Term Goals: 4 weeks  1. Patient to report of decreased pain levels left lateral ankle with standing, walking, and AROM  from 7/10 to 3/10   2. Patient to demonstrate increased left ankle ROM in dorsiflexion from -10 degrees from neutral dorsiflexion to 10 degrees past neutral dorsiflexion in order to have improved ankle motion and progression with ex to further ROM ex at left ankle  3. Patient to tolerate MMT without ankle pain with strength 3-/5 to 4/5 with improved stability through ankle without pain and improved strength with walking, standing statically. 4. Patient to be able to narrow stance with eyes open with equal wt shift through right/left LEs. Tandem stance for 45 seconds r/l without sway when able to be further assessed. Long Term Goals: 8 weeks  1. FAAM 21/84 to 40/84  2. Independent in HEP with decreased pain, improved ROM, strength with progressing improvement with functional mobility with walking,standing, transfers. Patient Education: Monitor for any redness or irritation from tape. Remove if any irritation. Wear for 4 days as tolerated. Continue HEP. [x]  HEP/Education Completed: Continue with icing her ankle at home and completing her exercises.  [facility] Access Code:  []  No new Education completed  [x]  Reviewed Prior HEP      [x]  Patient verbalized and/or demonstrated understanding of education provided. []  Patient unable to verbalize and/or demonstrate understanding of education provided. Will continue education. []  Barriers to learning:     PLAN:  Treatment Recommendations: Strengthening, Range of Motion, Balance Training, Functional Mobility Training, Gait Training, Stair Training, Neuromuscular Re-education, Manual Therapy - Soft Tissue Mobilization, Pain Management, Home Exercise Program, Patient Education, Integrative Dry Needling, Aquatics and Modalities    []  Plan of care initiated. Plan to see patient 2 times per week for 8 weeks to address the treatment planned outlined above. [x]  Continue with current plan of care  []  Modify plan of care as follows: Follow up with physician for evaluation of left knee and possible further consult with ortho on left ankle sprain.    []  Hold pending physician visit  []  Discharge    Time In 1336   Time Out 1408   Timed Code Minutes: 32   Total Treatment Time: 32     Electronically Signed by: Za Crawford PT

## 2021-10-27 ENCOUNTER — HOSPITAL ENCOUNTER (OUTPATIENT)
Dept: PHYSICAL THERAPY | Age: 46
Setting detail: THERAPIES SERIES
Discharge: HOME | End: 2021-10-27
Payer: COMMERCIAL

## 2021-10-27 PROCEDURE — 97110 THERAPEUTIC EXERCISES: CPT

## 2021-10-27 NOTE — PROGRESS NOTES
not as swollen and noting less stiffness at knee. Home ex program going pretty good. Using resistance band when she remembers to use it. She followed up with physician on Monday and she is to continue therapy focusing knee and continue with ankle rehab. Orders given to continue for 6 more weeks. Patient to bring in order next visit. TREATMENT   Precautions: Significant swelling and pain at left lateral ankle region. Tenderness and limited AROM due to pain. Tissue shortening. Pain: 6/10 left ankle and 610 left knee pain    X in shaded column indicates activity completed today   Modalities Parameters/  Location  Notes   Cold pack to patient's L ankle  x5 min   At end of session. Patient R sidelying with pillow under leg of support. Educated on performing at home today. Manual Therapy Time/Technique  Notes   STM to patient's L calf region with patient R sidelying and pillow between knees x8 min   Performed to assist with decreasing tightness to assist with increasing mobility. She reported a \"burning and aching\" sensation at times during   Kinesiotape for L lateral ankle sprain figure eight and for L patella Page 224 in KT book   Used Rock tape today         Exercise/Intervention   Notes   Active ankle dorsiflexion and plantarflexion 15x  x    Instruction in ice and elevation, Continue to wear ortho boot on LLE. Call physician regarding further follow up regarding left ankle and left knee.         Seated heel/toe raises 15x  x    Seated gentle gastroc stretch with strap 5x 10 sec  x    Seated small disk with 1/2 croquet ball taps (front/back), Cw/CCW  15x      Seated L quad set with foot on stool 15x 5 sec   Noted some medial knee pain                   LAQs painfree range 15x              2 way ankle resistance band pf/df, eversion 15x   x peach          Standing with ASO donned:        marches 10x  x    H/T raises 10x  x    rockerboard F/B and balance, side and balance 10x 2x 10 sec     Hip 3 way open chain/closed chain  through LLE  10x Peach band x    Mini knee bends with partial squat 5x  x                                                            Reassessment    x Refer to goal summary for status. Discussed level up for right shoe     Patient plans to order it. Specific Interventions Next Treatment: ice, possible Dry needling for left lateral ankle sprain, AROM left ankle beginning with ankle df/pf only. Discuss with doctor left knee consult/evaluation, left ankle orthopedist visit and further testing. Activity/Treatment Tolerance:  []  Patient tolerated treatment well  []  Patient limited by fatigue  [x]  Patient limited by pain   []  Patient limited by medical complications  [x]  Other: left knee pain as well with limited ROM, strength    Assessment: Reassessment today. REfer to goal summary for status. Noting improved ankle ROM/strength. FAAM improved to 32/84. Able to progress ex with resistance band for 3 way hip closed and open chain. Added eversion for resistance band for left ankle. ROM WNLS at ankle, Knee ROM in flexion 0 to 110 with limited flexion. Strength at left ankle increased to 4/5. Balance remains decreased in tandem stance. Will continue PT for ankle and knee rehab for 2x per week for up to 6 weeks. GOALS:  Patient Goal: To be able to return to work without left ankle pain, swelling, and improved walking tolerance. Also to have less left knee pain. Short Term Goals: 4 weeks  1. Patient to report of decreased pain levels left lateral ankle with standing, walking, and AROM  from 7/10 to 3/10   GOAL NOT MET Continues to complain of left ankle and knee pain 6/10 today. 2. Patient to demonstrate increased left ankle ROM in dorsiflexion from -10 degrees from neutral dorsiflexion to 10 degrees past neutral dorsiflexion in order to have improved ankle motion and progression with ex to further ROM ex at left ankle  GOAL MET ankle ROM 10 degrees past neutral DF.  Knee ROM GOAL NOT MET 0 degrees to 110 degrees. LLE  3. Patient to tolerate MMT without ankle pain with strength 3-/5 to 4/5 with improved stability through ankle without pain and improved strength with walking, standing statically. GOAL MET STrength improved to 4/5.   4. Patient to be able to narrow stance with eyes open with equal wt shift through right/left LEs. Tandem stance for 45 seconds r/l without sway when able to be further assessed. GOAL NOT MET equal wt shift in narrow stance without sway. TAndem stance r/l 8 seconds, l/r 16 seconds. Long Term Goals: 8 weeks  1. FAAM 21/84 to 40/84  GOAL NOT MET FAAM 32/84 with improvement noted. 2. Independent in HEP with decreased pain, improved ROM, strength with progressing improvement with functional mobility with walking,standing, transfers. GOAL ONGOING. Patient has HEP she is continuing to progress with HEP     Patient Education: Monitor for any redness or irritation from tape. Remove if any irritation. Wear for 4 days as tolerated. Continue HEP. [x]  HEP/Education Completed: Continue with icing her ankle at home and completing her exercises.  [employer] Access Code:  []  No new Education completed  [x]  Reviewed Prior HEP      [x]  Patient verbalized and/or demonstrated understanding of education provided. []  Patient unable to verbalize and/or demonstrate understanding of education provided. Will continue education. []  Barriers to learning:     PLAN:  Treatment Recommendations: Strengthening, Range of Motion, Balance Training, Functional Mobility Training, Gait Training, Stair Training, Neuromuscular Re-education, Manual Therapy - Soft Tissue Mobilization, Pain Management, Home Exercise Program, Patient Education, Integrative Dry Needling, Aquatics and Modalities    []  Plan of care initiated. Plan to see patient 2 times per week for 8 weeks to address the treatment planned outlined above.   [x]  Continue with current plan of care  []  Modify plan of care as follows: Follow up with physician for evaluation of left knee and possible further consult with ortho on left ankle sprain.    []  Hold pending physician visit  []  Discharge    Time In 0846   Time Out 0921   Timed Code Minutes: 35   Total Treatment Time: 35     Electronically Signed by: Cornel Garcia PT

## 2021-11-02 ENCOUNTER — HOSPITAL ENCOUNTER (OUTPATIENT)
Dept: PHYSICAL THERAPY | Age: 46
Setting detail: THERAPIES SERIES
Discharge: HOME | End: 2021-11-02
Payer: COMMERCIAL

## 2021-11-02 PROCEDURE — 97110 THERAPEUTIC EXERCISES: CPT

## 2021-11-02 NOTE — PROGRESS NOTES
[address] [facility]  PHYSICAL THERAPY  [] EVALUATION  [x] DAILY NOTE (LAND) [] DAILY NOTE (AQUATIC ) [] PROGRESS NOTE [] DISCHARGE NOTE    [x] OUTPATIENT REHABILITATION [facility]   [] [place] [address]    [] [facility]   [] Kole Burnett    Date: 2021  Patient Name:  Inderjit Valles  : 1975  MRN: [id number]  CSN: [id number]    Referring Practitioner Jack Oliveira DO   Diagnosis Sprain of calcaneofibular ligament of left ankle, initial encounter [S93.412A]    Treatment Diagnosis Significant swelling and pain left lateral ankle with difficulty walking, standing, AROM , transfers sit <>stand   Date of Evaluation 21    Additional Pertinent History HTN      Functional Outcome Measure Used FAA   Functional Outcome Score  (21)   32/84 10/21/21      Insurance: Primary: Payor: UMR /  /  / ,   Secondary:    Authorization Information: PRE CERTIFICATION REQUIRED: NO  INSURANCE THERAPY BENEFIT:  ALLOWED 60 VISTIS PT/OT/ST COMBINED PER CALENDAR YEAR. AQUATIC THERAPY COVERED: YES  MODALITIES COVERED:  YES, YES MASSAGE  TELEHEALTH COVERED: YES  REFERENCE NUMBER: [id number]   Visit # 11, 1/10 for progress note   Visits Allowed: 60 visits   Recertification Date:    Physician Follow-Up: Call following PT appointment for follow up. Has not followed with orthopedist.    Physician Orders:    History of Present Illness: Patient went to ER 2021 when she slipped and fell from a puddle of water. Noted swelling and bruising immediately with pain at her left ankle. Xrays have revealed no fracture or dislocation of ankle. SUBJECTIVE: Patient reports that her pain is not constant. She states that her pain reaches a 4.5-5/10 at her left ankle and knee. She reports compliance with her HEP. TREATMENT   Precautions: Significant swelling and pain at left lateral ankle region. Tenderness and limited AROM due to pain. Tissue shortening.     Pain: ice, possible Dry needling for left lateral ankle sprain, AROM left ankle beginning with ankle df/pf only. Discuss with doctor left knee consult/evaluation, left ankle orthopedist visit and further testing. Activity/Treatment Tolerance:  []  Patient tolerated treatment well  []  Patient limited by fatigue  [x]  Patient limited by pain   []  Patient limited by medical complications  [x]  Other: left knee pain as well with limited ROM, strength    Assessment: Patient completed therapeutic exercises as documented above with addition to standing balance exercises. She was limited with the amount of exercise progressions today due to being scheduled for a shorter treatment session. Did not apply kinesiotape this session due to patient now having a ASO brace and reports that it is working well and providing good support. GOALS:  Patient Goal: To be able to return to work without left ankle pain, swelling, and improved walking tolerance. Also to have less left knee pain. Short Term Goals: 4 weeks  1. Patient to report of decreased pain levels left lateral ankle with standing, walking, and AROM  from 7/10 to 3/10   2. Patient to demonstrate increased left ankle ROM in dorsiflexion from -10 degrees from neutral dorsiflexion to 10 degrees past neutral dorsiflexion in order to have improved ankle motion and progression with ex to further ROM ex at left ankle  4. Patient to be able to narrow stance with eyes open with equal wt shift through right/left LEs. Tandem stance for 45 seconds r/l without sway when able to be further assessed. Long Term Goals: 8 weeks  1. FAAM 21/84 to 40/84  2. Independent in HEP with decreased pain, improved ROM, strength with progressing improvement with functional mobility with walking,standing, transfers. Patient Education:   [x]  HEP/Education Completed: Added feet together with eyes closed and tandem stance with eyes open.        [employer] Access Code:  []  No new Education completed  [x]  Reviewed Prior HEP      [x]  Patient verbalized and/or demonstrated understanding of education provided. []  Patient unable to verbalize and/or demonstrate understanding of education provided. Will continue education. []  Barriers to learning:     PLAN:  Treatment Recommendations: Strengthening, Range of Motion, Balance Training, Functional Mobility Training, Gait Training, Stair Training, Neuromuscular Re-education, Manual Therapy - Soft Tissue Mobilization, Pain Management, Home Exercise Program, Patient Education, Integrative Dry Needling, Aquatics and Modalities    []  Plan of care initiated. Plan to see patient 2 times per week for 8 weeks to address the treatment planned outlined above. [x]  Continue with current plan of care  []  Modify plan of care as follows: Follow up with physician for evaluation of left knee and possible further consult with ortho on left ankle sprain.    []  Hold pending physician visit  []  Discharge    Time In 0805   Time Out 0830   Timed Code Minutes: 25 min   Total Treatment Time: 25 min     Electronically Signed by: Brendan Neumann PTA

## 2021-11-04 ENCOUNTER — HOSPITAL ENCOUNTER (OUTPATIENT)
Dept: PHYSICAL THERAPY | Age: 46
Setting detail: THERAPIES SERIES
Discharge: HOME | End: 2021-11-04
Payer: COMMERCIAL

## 2021-11-04 PROCEDURE — 97110 THERAPEUTIC EXERCISES: CPT

## 2021-11-09 ENCOUNTER — HOSPITAL ENCOUNTER (OUTPATIENT)
Dept: PHYSICAL THERAPY | Age: 46
Setting detail: THERAPIES SERIES
Discharge: HOME | End: 2021-11-09
Payer: COMMERCIAL

## 2021-11-09 PROCEDURE — 97110 THERAPEUTIC EXERCISES: CPT

## 2021-11-09 NOTE — PROGRESS NOTES
[address] [facility]  PHYSICAL THERAPY  [] EVALUATION  [x] DAILY NOTE (LAND) [] DAILY NOTE (AQUATIC) [] PROGRESS NOTE [] DISCHARGE NOTE    [x] OUTPATIENT REHABILITATION [facility]   [] [place] [address]    [] [facility]   [] Brittni Lopez    Date: 2021  Patient Name:  Etta Bae  : 1975  MRN: [id number]  CSN: [id number]    Referring Practitioner Mino Heredia DO   Diagnosis Sprain of calcaneofibular ligament of left ankle, initial encounter [S93.412A]    Treatment Diagnosis Significant swelling and pain left lateral ankle with difficulty walking, standing, AROM , transfers sit <>stand   Date of Evaluation 21    Additional Pertinent History HTN      Functional Outcome Measure Used FAA   Functional Outcome Score  (21)   32/84 10/21/21      Insurance: Primary: Payor: UMR /  /  / ,   Secondary:    Authorization Information: PRE CERTIFICATION REQUIRED: NO  INSURANCE THERAPY BENEFIT:  ALLOWED 60 VISTIS PT/OT/ST COMBINED PER CALENDAR YEAR. AQUATIC THERAPY COVERED: YES  MODALITIES COVERED:  YES, YES MASSAGE  TELEHEALTH COVERED: YES  REFERENCE NUMBER: [id number]   Visit # 13, 3/10 for progress note   Visits Allowed: 60 visits   Recertification Date:    Physician Follow-Up: Call following PT appointment for follow up. Has not followed with orthopedist.    Physician Orders:    History of Present Illness: Patient went to ER 2021 when she slipped and fell from a puddle of water. Noted swelling and bruising immediately with pain at her left ankle. Xrays have revealed no fracture or dislocation of ankle. SUBJECTIVE:  Patient reports she just got off working her night shift. States she went the full eight hours without wearing her brace and her ankle is a little swollen and sore this morning. TREATMENT   Precautions: Significant swelling and pain at left lateral ankle region. Tenderness and limited AROM due to pain.  Tissue shortening. Pain: 5/10 Left ankle, 4/10 Left knee pain    X in shaded column indicates activity completed today   Modalities Parameters/  Location  Notes   Cold pack to patient's L ankle  x5 min   At end of session. Patient R sidelying with pillow under leg of support. Educated on performing at home today. Manual Therapy Time/Technique  Notes   STM to patient's L calf region with patient R sidelying and pillow between knees x8 min   Performed to assist with decreasing tightness to assist with increasing mobility. She reported a \"burning and aching\" sensation at times during   Kinesiotape for L lateral ankle sprain figure eight and for L patella Page 224 in KT book   Used Rock tape today         Exercise/Intervention   Notes   Active ankle dorsiflexion and plantarflexion 15x      Instruction in ice and elevation, Continue to wear ortho boot on LLE. Call physician regarding further follow up regarding left ankle and left knee. NuStep (seat 11/arms 11) Level 5 5 minutes X           Seated heel/toe raises 20x  X    Seated gentle gastroc stretch with strap 5x 15 sec  X    Seated small disc with 1/2 croquet ball taps (front/back), side/side, Cw/CCW  15x  X    Seated L quad set with foot on stool 15x 5 sec   Noted some medial knee pain                   LAQs painfree range 15x              Two way ankle resistance band pf/df, eversion 15x   X peach          Standing with ASO donned:        Gastroc and soleus stretch at edge of // bars  3x each Left 15-20 sec  X    Marches 15x  X    Heel/toe raises 10x  X    Rockerboard F/B and balance, side and balance 15x taps  30 sec balance X Use of UE with taps and frequent hand taps with balance. Hip 3 way open chain/closed chain through LLE  10x Peach band X Performed without resistance band today due to increased left knee pain after last session.     Mini knee bends with partial squat 10x  X    Feet together EC  1 minute X No UE   Tandem stance (bilateral lead) with EO  1 minute X No UE   Dynamic gait: tandem walking, retro, side stepping, marches  2 laps in // bars   X Slight use of UE's                                       Reassessment     Refer to goal summary for status. Discussed level up for right shoe     Patient plans to order it. Specific Interventions Next Treatment: ice, possible Dry needling for left lateral ankle sprain, AROM left ankle beginning with ankle df/pf only. Discuss with doctor left knee consult/evaluation, left ankle orthopedist visit and further testing. Activity/Treatment Tolerance:  []  Patient tolerated treatment well  []  Patient limited by fatigue  [x]  Patient limited by pain   []  Patient limited by medical complications  [x]  Other: left knee pain as well with limited ROM, strength    Assessment:  Added NuStep today with patient tolerating fairly well. Slight increase in Left knee discomfort while on NuStep but eased after completion of activity. Did not progress activities otherwise due to elevated pain levels. Patient rated Left knee 6/10 and Left ankle 7/10 at end of therapy session. Encouraged patient to ice and elevate upon returning home. Discussed with patient a schedule for weaning from the brace. GOALS:  Patient Goal: To be able to return to work without left ankle pain, swelling, and improved walking tolerance. Also to have less left knee pain. Short Term Goals: 4 weeks  1. Patient to report of decreased pain levels left lateral ankle with standing, walking, and AROM  from 7/10 to 3/10   2. Patient to demonstrate increased left ankle ROM in dorsiflexion from -10 degrees from neutral dorsiflexion to 10 degrees past neutral dorsiflexion in order to have improved ankle motion and progression with ex to further ROM ex at left ankle  4. Patient to be able to narrow stance with eyes open with equal wt shift through right/left LEs.  Tandem stance for 45 seconds r/l without sway when able to be further assessed. Long Term Goals: 8 weeks   1. FAAM 21/84 to 40/84  2. Independent in HEP with decreased pain, improved ROM, strength with progressing improvement with functional mobility with walking,standing, transfers. Patient Education:   [x]  HEP/Education Completed:  Weaning schedule to work outside of brace. Educated on continuing to ice her knee and ankle at home.  [employer] Access Code:  []  No new Education completed  [x]  Reviewed Prior HEP      [x]  Patient verbalized and/or demonstrated understanding of education provided. []  Patient unable to verbalize and/or demonstrate understanding of education provided. Will continue education. []  Barriers to learning:     PLAN:  Treatment Recommendations: Strengthening, Range of Motion, Balance Training, Functional Mobility Training, Gait Training, Stair Training, Neuromuscular Re-education, Manual Therapy - Soft Tissue Mobilization, Pain Management, Home Exercise Program, Patient Education, Integrative Dry Needling, Aquatics and Modalities    []  Plan of care initiated. Plan to see patient 2 times per week for 8 weeks to address the treatment planned outlined above. [x]  Continue with current plan of care  []  Modify plan of care as follows: Follow up with physician for evaluation of left knee and possible further consult with ortho on left ankle sprain.    []  Hold pending physician visit  []  Discharge    Time In [id number]   Time Out 8413   Timed Code Minutes:  39 min   Total Treatment Time: 39 min     Electronically Signed by: Yecenia Valle PTA

## 2021-11-17 ENCOUNTER — HOSPITAL ENCOUNTER (OUTPATIENT)
Dept: PHYSICAL THERAPY | Age: 46
Setting detail: THERAPIES SERIES
Discharge: HOME | End: 2021-11-17
Payer: COMMERCIAL

## 2021-11-17 PROCEDURE — 97110 THERAPEUTIC EXERCISES: CPT

## 2021-11-17 NOTE — PROGRESS NOTES
[address] [facility]  PHYSICAL THERAPY  [] EVALUATION  [x] DAILY NOTE (LAND) [] DAILY NOTE (AQUATIC) [] PROGRESS NOTE [] DISCHARGE NOTE    [x] OUTPATIENT REHABILITATION CENTER [facility]   [] [place] [address]    [] [facility]   [] Thanh Gutierrez     Date: 2021   Patient Name:  Tiarra Monique  : 1975  MRN: [id number]  CSN: [id number]    Referring Practitioner Marthe Denver, DO   Diagnosis Sprain of calcaneofibular ligament of left ankle, initial encounter [S93.412A]    Treatment Diagnosis Significant swelling and pain left lateral ankle with difficulty walking, standing, AROM , transfers sit <>stand   Date of Evaluation 21    Additional Pertinent History HTN      Functional Outcome Measure Used FAA   Functional Outcome Score  (21)   32/84 10/21/21      Insurance: Primary: Payor: UMR /  /  / ,   Secondary:    Authorization Information: PRE CERTIFICATION REQUIRED: NO  INSURANCE THERAPY BENEFIT:  ALLOWED 60 VISTIS PT/OT/ST COMBINED PER CALENDAR YEAR. AQUATIC THERAPY COVERED: YES  MODALITIES COVERED:  YES, YES MASSAGE  TELEHEALTH COVERED: YES  REFERENCE NUMBER: [id number]   Visit # 14, 4/10 for progress note   Visits Allowed: 60 visits   Recertification Date: [id number]   Physician Follow-Up: Call following PT appointment for follow up. Has not followed with orthopedist.    Physician Orders:    History of Present Illness: Patient went to ER 2021 when she slipped and fell from a puddle of water. Noted swelling and bruising immediately with pain at her left ankle. Xrays have revealed no fracture or dislocation of ankle. SUBJECTIVE:  Patient reports that she had five days off of work so last night was her first night back. She states that her pain is elevated more today due to work. She rates her left ankle and knee pain a 4/10. She reports that she goes four hours with her ankle brace off and 4 hours with her ankle brace on at work. TREATMENT   Precautions: Significant swelling and pain at left lateral ankle region. Tenderness and limited AROM due to pain. Tissue shortening. Pain: 4/10 Left ankle, 4/10 Left knee pain    X in shaded column indicates activity completed today   Modalities Parameters/  Location  Notes   Cold pack to patient's L ankle  x5 min   At end of session. Patient R sidelying with pillow under leg of support. Educated on performing at home today. Manual Therapy Time/Technique  Notes   STM to patient's L calf region with patient R sidelying and pillow between knees x8 min   Performed to assist with decreasing tightness to assist with increasing mobility. She reported a \"burning and aching\" sensation at times during   Kinesiotape for L lateral ankle sprain figure eight and for L patella Page 224 in KT book   Used Rock tape today         Exercise/Intervention   Notes   Active ankle dorsiflexion and plantarflexion 15x      Instruction in ice and elevation, Continue to wear ortho boot on LLE. Call physician regarding further follow up regarding left ankle and left knee. NuStep (seat 11/arms 11) Level 5 5 minutes X           Seated heel/toe raises 20x  X    Seated gentle gastroc stretch with strap 5x 15 sec  X    Seated small disc with 1/2 croquet ball taps (front/back), side/side, Cw/CCW  15x  X    Seated L quad set with foot on stool 15x 5 sec   Noted some medial knee pain                   LAQs painfree range 15x              Two way ankle resistance band pf/df, eversion 15x   X peach          Standing with ASO donned:        Gastroc and soleus stretch at edge of // bars  3x each Left 15-20 sec  X    Marches 15x  X    Heel/toe raises 15x  X    Rockerboard F/B and balance, side and balance 15x taps  30 sec balance X Use of UE with taps and frequent hand taps with balance.    Hip 3 way open chain/closed chain through LLE  10x Peach band X Denied any increase in her left knee pain today with theraband Mini knee bends with partial squat 15x  X    Feet together EC  1 minute X No UE   Tandem stance (bilateral lead) with EO  1 minute X No UE   Dynamic gait: tandem walking, retro, side stepping, marches  2 laps in // bars   X No use of UE   Hydrostick x4 directions with tandem stance (B lead) 10x each   X                                Reassessment     Refer to goal summary for status. Discussed level up for right shoe     Patient plans to order it. Specific Interventions Next Treatment: ice, possible Dry needling for left lateral ankle sprain, AROM left ankle beginning with ankle df/pf only. Discuss with doctor left knee consult/evaluation, left ankle orthopedist visit and further testing. Activity/Treatment Tolerance:  []  Patient tolerated treatment well  []  Patient limited by fatigue  [x]  Patient limited by pain   []  Patient limited by medical complications  [x]  Other: left knee pain as well with limited ROM, strength    Assessment: Patient progressed reps with therapeutic exercises as documented above. Added hydrostick exercises. She completed standing exercises with her ASO ankle brace donned. She required demonstration and cues on proper technique with added exercises to ensure maximal muscle activation. She denied any increase in her pain at the conclusion of session. GOALS:  Patient Goal: To be able to return to work without left ankle pain, swelling, and improved walking tolerance. Also to have less left knee pain. Short Term Goals: 4 weeks  1. Patient to report of decreased pain levels left lateral ankle with standing, walking, and AROM  from 7/10 to 3/10   2. Patient to demonstrate increased left ankle ROM in dorsiflexion from -10 degrees from neutral dorsiflexion to 10 degrees past neutral dorsiflexion in order to have improved ankle motion and progression with ex to further ROM ex at left ankle  4.  Patient to be able to narrow stance with eyes open with equal wt shift through right/left LEs. Tandem stance for 45 seconds r/l without sway when able to be further assessed. Long Term Goals: 8 weeks   1. FAAM 21/84 to 40/84  2. Independent in HEP with decreased pain, improved ROM, strength with progressing improvement with functional mobility with walking,standing, transfers. Patient Education:   [x]  HEP/Education Completed: Added hydrostick exercises. Continuing with HEP.  [employer] Access Code:  []  No new Education completed  [x]  Reviewed Prior HEP      [x]  Patient verbalized and/or demonstrated understanding of education provided. []  Patient unable to verbalize and/or demonstrate understanding of education provided. Will continue education. []  Barriers to learning:     PLAN:  Treatment Recommendations: Strengthening, Range of Motion, Balance Training, Functional Mobility Training, Gait Training, Stair Training, Neuromuscular Re-education, Manual Therapy - Soft Tissue Mobilization, Pain Management, Home Exercise Program, Patient Education, Integrative Dry Needling, Aquatics and Modalities    []  Plan of care initiated. Plan to see patient 2 times per week for 8 weeks to address the treatment planned outlined above. [x]  Continue with current plan of care  []  Modify plan of care as follows: Follow up with physician for evaluation of left knee and possible further consult with ortho on left ankle sprain.    []  Hold pending physician visit  []  Discharge    Time In 0802   Time Out 0844   Timed Code Minutes: 42 min   Total Treatment Time: 42 min     Electronically Signed by: Won Barnett PTA

## 2021-11-22 ENCOUNTER — APPOINTMENT (OUTPATIENT)
Dept: PHYSICAL THERAPY | Age: 46
End: 2021-11-22
Payer: COMMERCIAL

## 2021-11-24 ENCOUNTER — HOSPITAL ENCOUNTER (OUTPATIENT)
Dept: PHYSICAL THERAPY | Age: 46
Setting detail: THERAPIES SERIES
Discharge: HOME | End: 2021-11-24
Payer: COMMERCIAL

## 2021-11-24 PROCEDURE — 97110 THERAPEUTIC EXERCISES: CPT

## 2021-11-24 NOTE — PROGRESS NOTES
[address] [facility]  PHYSICAL THERAPY  [] EVALUATION  [x] DAILY NOTE (LAND) [] DAILY NOTE (AQUATIC) [] PROGRESS NOTE [] DISCHARGE NOTE    [x] OUTPATIENT REHABILITATION CENTER [facility]   [] [place] [address]    [] [facility]   [] Esteban Iniguez      Date: 2021    Patient Name:  Félix Crowley  : 1975   MRN: [id number]  CSN: [id number]    Referring Practitioner Karyn Gonzalez DO   Diagnosis Sprain of calcaneofibular ligament of left ankle, initial encounter [S93.412A]    Treatment Diagnosis Significant swelling and pain left lateral ankle with difficulty walking, standing, AROM , transfers sit <>stand   Date of Evaluation 21    Additional Pertinent History HTN      Functional Outcome Measure Used FAA   Functional Outcome Score  (21)   32/84 10/21/21      Insurance: Primary: Payor: UMR /  /  / ,   Secondary:    Authorization Information: PRE CERTIFICATION REQUIRED: NO  INSURANCE THERAPY BENEFIT:  ALLOWED 60 VISTIS PT/OT/ST COMBINED PER CALENDAR YEAR. AQUATIC THERAPY COVERED: YES  MODALITIES COVERED:  YES, YES MASSAGE  TELEHEALTH COVERED: YES  REFERENCE NUMBER: [id number]   Visit # 15, 5/10 for progress note   Visits Allowed: 60 visits   Recertification Date: 3245   Physician Follow-Up: Call following PT appointment for follow up. Has not followed with orthopedist.    Physician Orders:    History of Present Illness: Patient went to ER 2021 when she slipped and fell from a puddle of water. Noted swelling and bruising immediately with pain at her left ankle. Xrays have revealed no fracture or dislocation of ankle. SUBJECTIVE: Patient reports that last night she completed her work shift without her ASO brace. She rates her left ankle and knee pain a 3/10 this morning. TREATMENT   Precautions: Significant swelling and pain at left lateral ankle region. Tenderness and limited AROM due to pain. Tissue shortening.     Pain: 3/10 Left ankle, 3/10 Left knee pain    X in shaded column indicates activity completed today   Modalities Parameters/  Location  Notes   Cold pack to patient's L ankle  x5 min   At end of session. Patient R sidelying with pillow under leg of support. Educated on performing at home today. Manual Therapy Time/Technique  Notes   STM to patient's L calf region with patient R sidelying and pillow between knees x8 min   Performed to assist with decreasing tightness to assist with increasing mobility. She reported a \"burning and aching\" sensation at times during   Kinesiotape for L lateral ankle sprain figure eight and for L patella Page 224 in KT book   Used Rock tape today         Exercise/Intervention   Notes   Active ankle dorsiflexion and plantarflexion 15x      Instruction in ice and elevation, Continue to wear ortho boot on LLE. Call physician regarding further follow up regarding left ankle and left knee. NuStep (seat 11/arms 11) Level 5 5 minutes X           Seated heel/toe raises 20x      Seated gentle gastroc stretch with strap 5x 15 sec      Seated small disc with 1/2 croquet ball taps (front/back), side/side, Cw/CCW  15x      Seated L quad set with foot on stool 15x 5 sec   Noted some medial knee pain                   LAQs painfree range 15x              Two way ankle resistance band pf/df, eversion 15x    peach          Standing with ASO donned:    X Performed without ASO brace donned this session    Gastroc and soleus stretch at edge of // bars  3x each Left 15-20 sec  X    Marches 15x  X    Heel/toe raises 15x  X    Rockerboard F/B and balance, side and balance 15x taps  30 sec balance X Use of UE with taps and frequent hand taps with balance.    Hip 3 way open chain/closed chain through LLE  15x Peach band X Denied any increase in her left knee pain today with theraband    Mini knee bends with partial squat 15x  X    Feet together EC standing on foam   1 minute X No UE   Tandem stance (bilateral lead) with EO standing on foam   1 minute X No UE   Dynamic gait: tandem walking, retro tandem walking, side stepping, marches  2 laps in // bars   X No use of UE   Hydrostick x4 directions with tandem stance (B lead) 10x each   X    Step ups onto BOSU ball (forward/lateral up and over) 10x each B   X No use of UE with right LE lead, occasional hand taps with left LE lead. Reassessment     Refer to goal summary for status. Discussed level up for right shoe     Patient plans to order it. Specific Interventions Next Treatment: ice, possible Dry needling for left lateral ankle sprain, AROM left ankle beginning with ankle df/pf only. Discuss with doctor left knee consult/evaluation, left ankle orthopedist visit and further testing. Activity/Treatment Tolerance:  []  Patient tolerated treatment well  []  Patient limited by fatigue  [x]  Patient limited by pain   []  Patient limited by medical complications  [x]  Other: left knee pain as well with limited ROM, strength    Assessment: Patient did not wear her ASO ankle brace to therapy today so performed standing exercises without ankle brace. Added forward and lateral step ups onto BOSU ball. Progressed to standing on foam with feet together EC and tandem stance with EO exercises. She required cues on proper technique with partial squat exercise. Demonstration and cues provided on proper technique with added exercises to ensure maximal muscle activation. She reported increased \"stiffness, tightness\" feeling at the conclusion of session, but denied any increase in her pain. GOALS:  Patient Goal: To be able to return to work without left ankle pain, swelling, and improved walking tolerance. Also to have less left knee pain. Short Term Goals: 4 weeks  1. Patient to report of decreased pain levels left lateral ankle with standing, walking, and AROM  from 7/10 to 3/10   2.  Patient to demonstrate increased left ankle ROM in dorsiflexion from -10 degrees from neutral dorsiflexion to 10 degrees past neutral dorsiflexion in order to have improved ankle motion and progression with ex to further ROM ex at left ankle  4. Patient to be able to narrow stance with eyes open with equal wt shift through right/left LEs. Tandem stance for 45 seconds r/l without sway when able to be further assessed. Long Term Goals: 8 weeks   1. FAAM 21/84 to 40/84  2. Independent in HEP with decreased pain, improved ROM, strength with progressing improvement with functional mobility with walking,standing, transfers. Patient Education:   [x]  HEP/Education Completed: Added forward and lateal step ups at [employer]. Added foam with balance exercises as documented above. Educated on monitoring her symptoms after progressions and not having her ASO brace donned this session.  [facility] Access Code:  []  No new Education completed  [x]  Reviewed Prior HEP      [x]  Patient verbalized and/or demonstrated understanding of education provided. []  Patient unable to verbalize and/or demonstrate understanding of education provided. Will continue education. []  Barriers to learning:     PLAN:  Treatment Recommendations: Strengthening, Range of Motion, Balance Training, Functional Mobility Training, Gait Training, Stair Training, Neuromuscular Re-education, Manual Therapy - Soft Tissue Mobilization, Pain Management, Home Exercise Program, Patient Education, Integrative Dry Needling, Aquatics and Modalities    []  Plan of care initiated. Plan to see patient 2 times per week for 8 weeks to address the treatment planned outlined above. [x]  Continue with current plan of care  []  Modify plan of care as follows: Follow up with physician for evaluation of left knee and possible further consult with ortho on left ankle sprain.    []  Hold pending physician visit  []  Discharge    Time In 0803   Time Out 0844   Timed Code Minutes: 41 min   Total Treatment Time: 41 min     Electronically Signed by: Tami Agustin PTA

## 2021-11-29 ENCOUNTER — HOSPITAL ENCOUNTER (OUTPATIENT)
Dept: PHYSICAL THERAPY | Age: 46
Setting detail: THERAPIES SERIES
Discharge: HOME | End: 2021-11-29
Payer: COMMERCIAL

## 2021-11-29 PROCEDURE — 97110 THERAPEUTIC EXERCISES: CPT

## 2021-11-29 NOTE — PROGRESS NOTES
[address] [facility]  PHYSICAL THERAPY  [] EVALUATION  [x] DAILY NOTE (LAND) [] DAILY NOTE (AQUATIC) [] PROGRESS NOTE [] DISCHARGE NOTE    [x] OUTPATIENT REHABILITATION [facility]   [] [place] [address]    [] [facility]   [] Emelyn Crimes      Date: 2021    Patient Name:  Twyla Puga  : 1975   MRN: [id number]  CSN: [id number]    Referring Practitioner Nilsa Diggs DO   Diagnosis Sprain of calcaneofibular ligament of left ankle, initial encounter [S93.412A]    Treatment Diagnosis Significant swelling and pain left lateral ankle with difficulty walking, standing, AROM , transfers sit <>stand   Date of Evaluation 21    Additional Pertinent History HTN      Functional Outcome Measure Used FAA   Functional Outcome Score  (21)   32/84 10/21/21      Insurance: Primary: Payor: UMR /  /  / ,   Secondary:    Authorization Information: PRE CERTIFICATION REQUIRED: NO  INSURANCE THERAPY BENEFIT:  ALLOWED 60 VISTIS PT/OT/ST COMBINED PER CALENDAR YEAR. AQUATIC THERAPY COVERED: YES  MODALITIES COVERED:  YES, YES MASSAGE  TELEHEALTH COVERED: YES  REFERENCE NUMBER: [id number]   Visit # 16, 6/10 for progress note   Visits Allowed: 60 visits   Recertification Date:    Physician Follow-Up: Call following PT appointment for follow up. Has not followed with orthopedist.    Physician Orders:    History of Present Illness: Patient went to ER 2021 when she slipped and fell from a puddle of water. Noted swelling and bruising immediately with pain at her left ankle. Xrays have revealed no fracture or dislocation of ankle. SUBJECTIVE: Patient reporting that left ankle is doing better with pain level 2/10. Knee pain a little more aggravated 3/10. Patient has not worn left ankle support for about 4 days. Notes that ankle is sore and feels though it is getting stronger. Did not wear ASO to PT today.     TREATMENT   Precautions: Significant swelling and pain at left lateral ankle region. Tenderness and limited AROM due to pain. Tissue shortening. Pain: 2/10 Left ankle, 3/10 Left knee pain    X in shaded column indicates activity completed today   Modalities Parameters/  Location  Notes   Cold pack to patient's L ankle  x5 min   At end of session. Patient R sidelying with pillow under leg of support. Educated on performing at home today. Manual Therapy Time/Technique  Notes   STM to patient's L calf region with patient R sidelying and pillow between knees x8 min   Performed to assist with decreasing tightness to assist with increasing mobility. She reported a \"burning and aching\" sensation at times during   Kinesiotape for L lateral ankle sprain figure eight and for L patella Page 224 in KT book   Used Rock tape today         Exercise/Intervention   Notes   Active ankle dorsiflexion and plantarflexion 15x      Instruction in ice and elevation, Continue to wear ortho boot on LLE. Call physician regarding further follow up regarding left ankle and left knee. NuStep (seat 11/arms 11) Level 5 5 minutes     Matrix seat 10 stat. Bicycle  Level 2 5 minutes  x    Seated heel/toe raises 20x      Seated gentle gastroc stretch with strap 5x 15 sec      Seated small disc with 1/2 croquet ball taps (front/back), side/side, Cw/CCW  15x      Seated L quad set with foot on stool 15x 5 sec   Noted some medial knee pain                   LAQs painfree range 15x   x    Seated knee flexion with band 20x orange x    Two way ankle resistance band pf/df, eversion 20x  orange x           Standing    xx Performed without ASO brace donned this session    Gastroc and soleus stretch at edge of // bars  3x each Left 15-20 sec      Marches 15x      Heel/toe raises 15x  x    Rockerboard F/B and balance, side and balance 15x taps  30 sec balance x Use of UE with taps and frequent hand taps with balance.    Hip 3 way open chain/closed chain through LLE  15x Peach band x     Mini knee bends with partial squat 10x  x    Feet together EC standing on foam   1 minute x No UE   Tandem stance (bilateral lead) with EO standing on foam   1 minute x No UE   Dynamic gait: tandem walking, retro tandem walking, side stepping, marches, sidestepping with green band around thighs  2 laps in // bars   x No use of UE   Hydrostick x4 directions with tandem stance (B lead) 10x each   x    Step ups onto BOSU ball (forward lunges reciprocally/lateral lunges up  10x each B   x No use of UE with right LE lead, occasional hand taps with left LE lead. Hamstring stretch at step L/R LE  3x 10 count x    heelcord stretch with knee straight on 6 inch step  3x 10 count x           Reassessment     Refer to goal summary for status. Discussed level up for right shoe     Patient plans to order it. Specific Interventions Next Treatment: ice, possible Dry needling for left lateral ankle sprain, AROM left ankle beginning with ankle df/pf only. Discuss with doctor left knee consult/evaluation, left ankle orthopedist visit and further testing. Activity/Treatment Tolerance:  []  Patient tolerated treatment well  []  Patient limited by fatigue  [x]  Patient limited by pain   []  Patient limited by medical complications  [x]  Other: left knee pain as well with limited ROM, strength    Assessment:  Progressed patient with sidestepping in partial squat with resistance band, increased reps and added increased resistance to orange band. Added seated knee flexion with orange resistance band. Knee (left) is irritable with full knee extension ie LAQs so worked painfree range. Noting improved walking pattern with more equal wt shift through LLE/RLE. Balance and strength improving. GOALS:  Patient Goal: To be able to return to work without left ankle pain, swelling, and improved walking tolerance. Also to have less left knee pain. Short Term Goals: 4 weeks  1.  Patient to report of decreased pain levels left lateral ankle with standing, walking, and AROM  from 7/10 to 3/10   2. Patient to demonstrate increased left ankle ROM in dorsiflexion from -10 degrees from neutral dorsiflexion to 10 degrees past neutral dorsiflexion in order to have improved ankle motion and progression with ex to further ROM ex at left ankle  4. Patient to be able to narrow stance with eyes open with equal wt shift through right/left LEs. Tandem stance for 45 seconds r/l without sway when able to be further assessed. Long Term Goals: 8 weeks   1. FAAM 21/84 to 40/84  2. Independent in HEP with decreased pain, improved ROM, strength with progressing improvement with functional mobility with walking,standing, transfers. Patient Education:   [x]  HEP/Education Completed: use orange band for 4 way ankle, sidestep ex with band around thighs. ASO not donned this session.  [facility] Access Code:  []  No new Education completed  [x]  Reviewed Prior HEP      [x]  Patient verbalized and/or demonstrated understanding of education provided. []  Patient unable to verbalize and/or demonstrate understanding of education provided. Will continue education. []  Barriers to learning:     PLAN:  Treatment Recommendations: Strengthening, Range of Motion, Balance Training, Functional Mobility Training, Gait Training, Stair Training, Neuromuscular Re-education, Manual Therapy - Soft Tissue Mobilization, Pain Management, Home Exercise Program, Patient Education, Integrative Dry Needling, Aquatics and Modalities    []  Plan of care initiated. Plan to see patient 2 times per week for 8 weeks to address the treatment planned outlined above. [x]  Continue with current plan of care  []  Modify plan of care as follows: Follow up with physician for evaluation of left knee and possible further consult with ortho on left ankle sprain.    []  Hold pending physician visit  []  Discharge    Time In 0800   Time Out 0843   Timed Code Minutes: 43 Total Treatment Time: 37     Electronically Signed by: Que Gold, PT

## 2021-11-30 ENCOUNTER — TELEMEDICINE (OUTPATIENT)
Dept: FAMILY MEDICINE CLINIC | Age: 46
End: 2021-11-30
Payer: COMMERCIAL

## 2021-11-30 DIAGNOSIS — J01.91 ACUTE RECURRENT SINUSITIS, UNSPECIFIED LOCATION: Primary | ICD-10-CM

## 2021-11-30 PROCEDURE — 99213 OFFICE O/P EST LOW 20 MIN: CPT | Performed by: STUDENT IN AN ORGANIZED HEALTH CARE EDUCATION/TRAINING PROGRAM

## 2021-11-30 ASSESSMENT — ENCOUNTER SYMPTOMS
RHINORRHEA: 1
SINUS PRESSURE: 1
SHORTNESS OF BREATH: 0
SORE THROAT: 1
VOMITING: 0
CONSTIPATION: 0
DIARRHEA: 0
NAUSEA: 0
ABDOMINAL PAIN: 0
COUGH: 0
BACK PAIN: 0
WHEEZING: 0

## 2021-11-30 NOTE — PROGRESS NOTES
2021    TELEHEALTH EVALUATION -- Audio/Visual (During Banner-82 public health emergency)    HPI:    Damien Mccormick (:  1975) has requested an audio/video evaluation for the following concern(s):    Patient seen today due to concerns of facial pressure, congestion, rhinorrhea, headache, and postnasal drip x1 day. Symptoms started yesterday and have progressed today. Symptoms are improved with steam and humidifier. She has not been around any known sick contacts. No known exposures to Covid. She is vaccinated against Covid. She does have a history of sinus infections, which occur typically in the fall. In the past these have had to be treated with antibiotics. Patient denies fever, chills, cough, chest pain, shortness of breath, nausea, vomiting, diarrhea. Review of Systems   Constitutional: Negative for activity change, chills, fatigue and fever. HENT: Positive for congestion, postnasal drip, rhinorrhea, sinus pressure and sore throat. Eyes: Negative for visual disturbance. Respiratory: Negative for cough, shortness of breath and wheezing. Cardiovascular: Negative for chest pain and palpitations. Gastrointestinal: Negative for abdominal pain, constipation, diarrhea, nausea and vomiting. Genitourinary: Negative for dysuria. Musculoskeletal: Negative for back pain. Neurological: Positive for headaches. Negative for dizziness, syncope and light-headedness. Psychiatric/Behavioral: Negative for dysphoric mood. The patient is not nervous/anxious. Prior to Visit Medications    Medication Sig Taking?  Authorizing Provider   amLODIPine (NORVASC) 2.5 MG tablet TAKE 1 TABLET BY MOUTH EVERY DAY  Aurelia Griffin DO   cetirizine (ZYRTEC) 10 MG tablet Take 10 mg by mouth daily  Historical Provider, MD       Social History     Tobacco Use    Smoking status: Never Smoker    Smokeless tobacco: Never Used   Vaping Use    Vaping Use: Never used   Substance Use Topics    Alcohol use: No    Drug use: No        Past Medical History:   Diagnosis Date    Hypertension    ,   Social History     Tobacco Use    Smoking status: Never Smoker    Smokeless tobacco: Never Used   Vaping Use    Vaping Use: Never used   Substance Use Topics    Alcohol use: No    Drug use: No       PHYSICAL EXAMINATION:  [ INSTRUCTIONS:  \"[x]\" Indicates a positive item  \"[]\" Indicates a negative item  -- DELETE ALL ITEMS NOT EXAMINED]  Vital Signs: (As obtained by patient/caregiver or practitioner observation)    Constitutional: [x] Appears well-developed and well-nourished [x] No apparent distress      [] Abnormal-   Mental status  [x] Alert and awake  [x] Oriented to person/place/time [x]Able to follow commands      Eyes:  EOM    [x]  Normal  [] Abnormal-  Sclera  [x]  Normal  [] Abnormal -         Discharge [x]  None visible  [] Abnormal -    HENT:   [x] Normocephalic, atraumatic. [] Abnormal   [x] Mouth/Throat: Mucous membranes are moist.     External Ears [x] Normal  [] Abnormal-     Neck: [x] No visualized mass     Pulmonary/Chest: [x] Respiratory effort normal.  [x] No visualized signs of difficulty breathing or respiratory distress        [] Abnormal-      Musculoskeletal:         [x] Normal range of motion of neck        [] Abnormal-       Neurological:        [x] No Facial Asymmetry (Cranial nerve 7 motor function) (limited exam to video visit)          [x] No gaze palsy        [] Abnormal-         Skin:        [x] No significant exanthematous lesions or discoloration noted on facial skin         [] Abnormal-            Psychiatric:       [x] Normal Affect [] No Hallucinations        [] Abnormal-     Other pertinent observable physical exam findings-  None    ASSESSMENT/PLAN:  1. Acute recurrent sinusitis, unspecified location  Patient's symptoms do not currently meet guidelines for bacterial sinusitis or patient to call back if symptoms worsen, and antibiotic therapy.   Discussed with patient that symptoms most likely related to viral cause. If they do not improve over the next week, or if they begin to prevent worsening she should call the office to have antibiotics at home. -Recommend increased fluid intake, increased rest, small frequent meals.  -Patient may try Coricidin HBP as needed  -Recommend Neti pot or saline rinses as tolerated  -Patient to send in paperwork for missing work if needed  -Patient will call the office if she does not improve within a week, will consider a course of Augmentin for 10 days at that time      No follow-ups on file. Etta Catalino, was evaluated through a synchronous (real-time) audio-video encounter. The patient (or guardian if applicable) is aware that this is a billable service. Verbal consent to proceed has been obtained within the past 12 months. The visit was conducted pursuant to the emergency declaration under the [address] [place] [address], [address] [place] [address] authority and the [employer] General Act. Patient identification was verified, and a caregiver was present when appropriate. The patient was located in a state where the provider was credentialed to provide care. Total time spent on this encounter: 27    --Grace Garrison MD on 11/30/2021 at 5:10 PM    An electronic signature was used to authenticate this note.

## 2021-11-30 NOTE — PROGRESS NOTES
Brady Finn is a 42 y.o. female being evaluated by a Virtual Visit (video visit) encounter to address concerns as mentioned above. A caregiver was present when appropriate. Due to this being a TeleHealth encounter (During XDYKU-47 public health emergency), evaluation of the following organ systems was limited: Vitals/Constitutional/EENT/Resp/CV/GI//MS/Neuro/Skin/Heme-Lymph-Imm. Pursuant to the emergency declaration under the [address] [place] [address] and the [employer] Act, this Virtual Visit was conducted with patient's (and/or legal guardian's) consent, to reduce the patient's risk of exposure to COVID-19 and provide necessary medical care. The patient (and/or legal guardian) has also been advised to contact this office for worsening conditions or problems, and seek emergency medical treatment and/or call 911 if deemed necessary. Services were provided through a video synchronous discussion virtually to substitute for in-person clinic visit. Patient and provider were located at their individual homes. --Nanda Huertas MD on 11/30/2021 at 6:19 PM    An electronic signature was used to authenticate this note. SUBJECTIVE     Brady Finn is a 42 y. o.female    Chief Complaint   Patient presents with    Congestion       Chief complaint, [place], and all pertinent details of the case reviewed with the resident. Please see resident's note for specific details discussed at today's visit.       Patient Active Problem List   Diagnosis    Lipoma    Essential hypertension    Sprain of calcaneofibular ligament of left ankle       Current Outpatient Medications   Medication Sig Dispense Refill    amLODIPine (NORVASC) 2.5 MG tablet TAKE 1 TABLET BY MOUTH EVERY DAY 90 tablet 3    cetirizine (ZYRTEC) 10 MG tablet Take 10 mg by mouth daily       No current facility-administered medications for this visit. Review of Systems per Dr. Ferraro 11/30/2021   Patient-Reported Weight -   Patient-Reported Height -   Patient-Reported Systolic 984   Patient-Reported Diastolic 86   Patient-Reported SpO2 97%        Due to this being a TeleHealth encounter, evaluation of the following organ systems is limited: Vitals/Constitutional/EENT/Resp/CV/GI//MS/Neuro/Skin/Heme-Lymph-Imm. PHYSICAL EXAMINATION:  [ INSTRUCTIONS:  \"[x]\" Indicates a positive item  \"[]\" Indicates a negative item  -- DELETE ALL ITEMS NOT EXAMINED]  Vital Signs: (All Vital Signs are pt reported, unless otherwise noted)   There were no vitals taken for this visit. Constitutional: [x] Appears well-developed and well-nourished [x] No apparent distress      [] Abnormal-   Mental status  [x] Alert and awake  [x] Oriented to person/place/time [x]Able to follow commands      Eyes:  EOM    [x]  Normal  [] Abnormal-  Sclera  [x]  Normal  [] Abnormal -         Discharge []  None visible  [] Abnormal -    HENT:   [x] Normocephalic, atraumatic.   [] Abnormal   [x] Mouth/Throat: Mucous membranes are moist.     External Ears [x] Normal  [] Abnormal-     Neck: [x] No visualized mass     Pulmonary/Chest: [x] Respiratory effort normal.  [x] No visualized signs of difficulty breathing or respiratory distress        [] Abnormal-      Musculoskeletal:   [] Normal gait with no signs of ataxia         [x] Normal range of motion of neck        [] Abnormal-       Neurological:        [x] No Facial Asymmetry (Cranial nerve 7 motor function) (limited exam to video visit)          [x] No gaze palsy        [] Abnormal-         Skin:        [x] No significant exanthematous lesions or discoloration noted on facial skin         [] Abnormal-            Psychiatric:       [x] Normal Affect [x] No Hallucinations        [] Abnormal-       Immunization History   Administered Date(s) Administered    COVID-19, J&J, PF, 0.5 mL 03/31/2021  Hepatitis A Ped/Adol (Havrix, Vaqta) 08/08/2016, 02/27/2017    Hepatitis B 08/08/2016, 09/07/2016, 02/27/2017    Influenza Virus Vaccine 03/02/2018    Influenza, High Dose (Fluzone 65 yrs and older) 11/01/2017    Tdap (Boostrix, Adacel) 08/08/2016       Health Maintenance   Topic Date Due    Hepatitis C screen  Never done    COVID-19 Vaccine (2 - Booster for [facility] series) 05/26/2021    Flu vaccine (1) 09/01/2021    Potassium monitoring  04/05/2022    Creatinine monitoring  04/05/2022    Lipid screen  04/05/2026    DTaP/Tdap/Td vaccine (2 - Td or Tdap) 08/08/2026    HIV screen  Completed    Hepatitis A vaccine  Aged Out    Hepatitis B vaccine  Aged Out    Hib vaccine  Aged Out    Meningococcal (ACWY) vaccine  Aged Out    Pneumococcal 0-64 years Vaccine  Aged Out             Future Appointments   Date Time Provider Lia Light   12/6/2021  8:15 AM Marquis Rice PT [facility]       ASSESSMENT       Diagnosis Orders   1. Acute recurrent sinusitis, unspecified location         PLAN      After discussion with pt and , we agreed on plan as follows:      Sinus Rinses/Neti pot as needed  Small frequent meals with plenty of fluids  Get plenty of rest  Coricidin HBP as needed for symptomatic relief  Will hold antibiotics at this time is no evidence of acute bacterial infection-patient will call and let us know if she develops significantly worsening symptoms in the meantime  Further management pending response to above treatment. Attending Physician Statement  I have discussed the case, including pertinent history and exam findings with the resident. I also have seen the patient and performed key portions of the examination. I agree with the documented assessment and plan as documented by the resident.   GC modifier added to this encounter     Electronically signed by Lizette Haider MD on 11/30/2021 at 6:19 PM

## 2021-12-01 ENCOUNTER — HOSPITAL ENCOUNTER (OUTPATIENT)
Dept: PHYSICAL THERAPY | Age: 46
Setting detail: THERAPIES SERIES
End: 2021-12-01
Payer: COMMERCIAL

## 2021-12-03 ENCOUNTER — TELEPHONE (OUTPATIENT)
Dept: FAMILY MEDICINE CLINIC | Age: 46
End: 2021-12-03

## 2021-12-03 NOTE — TELEPHONE ENCOUNTER
Yes I will complete the FMLA when she drops it off. Work noted pended for patient. If she is not better Monday morning please let me know and I will send an antibiotic in for her. Thank you!

## 2021-12-03 NOTE — LETTER
[address] [address],[address] [facility] [address]  Phone: [phone]  Fax: [phone]    Bishop Leta MD        December 6, 2021     Patient: Félix Crowley   YOB: 1975   Date of Visit: 11/30/21       To Whom it May Concern:    Harpal Hart was seen in my clinic on 11/30/21. She was unable to be at work from 11/30/21-12/6/21 due to illness. She may return to work on 12/6/21 at 10 pm.     If you have any questions or concerns, please don't hesitate to call.     Sincerely,         Bishop Leta MD

## 2021-12-03 NOTE — LETTER
[address] [place] [address],[address] [facility] [address] [place], [place] [address]  Phone: [phone]  Fax: [phone]    Victor Manuel Raymundo MD        December 3, 2021     Patient: Rachel Ramos   YOB: 1975   Date of Visit: 12/3/2021       To Whom It May Concern: It is my medical opinion that Anastacia Grajeda was seen in my office on 11/30/21 for sinusitis. She was unable to be at work from 11/30/21 - 12/3/21 due to illness. If you have any questions or concerns, please don't hesitate to call.     Sincerely,        Victor Manuel Raymundo MD

## 2021-12-03 NOTE — TELEPHONE ENCOUNTER
----- Message from Divya Jacome sent at 12/2/2021  4:56 PM EST -----  Subject: Message to Provider    QUESTIONS  Information for Provider? Patient is still dealing with her sinus   infection and does not feel up to returning to work this week (job doesnt   want back until it is cleared up). She will bring in [facility] paper work to   bring into the office. She also will need a return to work slip that she   can  next week  ---------------------------------------------------------------------------  --------------  [address]  What is the best way for the office to contact you? OK to leave message on   voicemail  Preferred Call Back Phone Number? [phone]  ---------------------------------------------------------------------------  --------------  SCRIPT ANSWERS  Relationship to Patient?  Self

## 2021-12-06 ENCOUNTER — HOSPITAL ENCOUNTER (OUTPATIENT)
Dept: PHYSICAL THERAPY | Age: 46
Setting detail: THERAPIES SERIES
Discharge: HOME | End: 2021-12-06
Payer: COMMERCIAL

## 2021-12-06 PROCEDURE — 97110 THERAPEUTIC EXERCISES: CPT

## 2021-12-06 PROCEDURE — 97164 PT RE-EVAL EST PLAN CARE: CPT

## 2021-12-06 NOTE — DISCHARGE SUMMARY
[facility]  PHYSICAL THERAPY  [] EVALUATION  [] DAILY NOTE (LAND) [] DAILY NOTE (AQUATIC) [] PROGRESS NOTE [x] DISCHARGE NOTE    [x] OUTPATIENT REHABILITATION CENTER [facility]   [] [address] [address]    [] [facility]   [] [facility]      Date: 2021    Patient Name:  Christie John  : 1975   MRN: [id number]  CSN: [id number]    Referring Practitioner Leticia Jhaveri DO   Diagnosis Sprain of calcaneofibular ligament of left ankle, initial encounter [S93.412A]    Treatment Diagnosis Significant swelling and pain left lateral ankle with difficulty walking, standing, AROM , transfers sit <>stand   Date of Evaluation 21    Additional Pertinent History HTN      Functional Outcome Measure Used FAA   Functional Outcome Score  (21) initial evaluation  32/84 10/21/21  71/84 12/6/21  Discharge      Insurance: Primary: Payor: [employer] /  /  / ,   Secondary:    Authorization Information: PRE CERTIFICATION REQUIRED: NO  INSURANCE THERAPY BENEFIT:  ALLOWED 60 VISTIS PT/OT/ST COMBINED PER CALENDAR YEAR. AQUATIC THERAPY COVERED: YES  MODALITIES COVERED:  YES, YES MASSAGE  TELEHEALTH COVERED: YES  REFERENCE NUMBER: [id number]   Visit # 17,  Discharge:  for progress note   Visits Allowed: 60 visits   Recertification Date:    Physician Follow-Up: Follow up on 2021   Physician Orders:    History of Present Illness: Patient went to ER 2021 when she slipped and fell from a puddle of water. Noted swelling and bruising immediately with pain at her left ankle. Xrays have revealed no fracture or dislocation of ankle. SUBJECTIVE: Patient feels she is okay/better, Did not do much over weekend due to sinus infection. She was laying in bed and noting a little more left knee pain. Feels ankle is 90% better.  But, continues to have left ankle problems with swelling when prolonged standing or walking (like when at work), Aching and pain are not as constant. Knee is 85% better and again with prolonged standing and sitting has achiness and pain. To manage swelling she ices ankle and does take Tylenol. But has not had to do this as frequently. Understands HEP to follow. TREATMENT   Precautions: Significant swelling and pain at left lateral ankle region. Tenderness and limited AROM due to pain. Tissue shortening. Pain: 2/10 Left ankle, 3/10 Left knee pain    X in shaded column indicates activity completed today   Modalities Parameters/  Location  Notes   Cold pack to patient's L ankle  x5 min   At end of session. Patient R sidelying with pillow under leg of support. Educated on performing at home today. Manual Therapy Time/Technique  Notes   STM to patient's L calf region with patient R sidelying and pillow between knees x8 min   Performed to assist with decreasing tightness to assist with increasing mobility. She reported a \"burning and aching\" sensation at times during   Kinesiotape for L lateral ankle sprain figure eight and for L patella Page 224 in KT book   Used Rock tape today         Exercise/Intervention   Notes   Active ankle dorsiflexion and plantarflexion 15x      Instruction in ice and elevation, Continue to wear ortho boot on LLE. Call physician regarding further follow up regarding left ankle and left knee. NuStep (seat 11/arms 11) Level 5 5 minutes     Matrix seat 10 stat.  Bicycle  Level 2 5 minutes      Seated heel/toe raises 20x      Seated gentle gastroc stretch with strap 5x 15 sec      Seated small disc with 1/2 croquet ball taps (front/back), side/side, Cw/CCW  15x      Seated L quad set with foot on stool 15x 5 sec   Noted some medial knee pain                   LAQs painfree range 15x       Seated knee flexion with band 20x orange     Two way ankle resistance band pf/df, eversion 20x  orange            Standing     Performed without ASO brace donned this session    Gastroc and soleus stretch at edge of // bars  3x each Left 15-20 sec      Marches 15x      Heel/toe raises 15x      Rockerboard F/B and balance, side and balance 15x taps  30 sec balance  Use of UE with taps and frequent hand taps with balance. Hip 3 way open chain/closed chain through LLE  15x Peach band      Mini knee bends with partial squat 10x      Feet together EC standing on foam   1 minute  No UE   Tandem stance (bilateral lead) with EO standing on foam   1 minute  No UE   Dynamic gait: tandem walking, retro tandem walking, side stepping, marches, sidestepping with green band around thighs  2 laps in // bars    No use of UE   Hydrostick x4 directions with tandem stance (B lead) 10x each       Step ups onto BOSU ball (forward lunges reciprocally/lateral lunges up  10x each B    No use of UE with right LE lead, occasional hand taps with left LE lead. Hamstring stretch at step L/R LE  3x 10 count     heelcord stretch with knee straight on 6 inch step  3x 10 count     HEP review of above exercises    x    Reassessment  10-12 minutes  x Refer to goal summary for status. Discussed level up for right shoe     Patient plans to order it. Specific Interventions Next Treatment: ice, possible Dry needling for left lateral ankle sprain, AROM left ankle beginning with ankle df/pf only. Discuss with doctor left knee consult/evaluation, left ankle orthopedist visit and further testing. Activity/Treatment Tolerance:  []  Patient tolerated treatment well  []  Patient limited by fatigue  [x]  Patient limited by pain   []  Patient limited by medical complications  [x]  Other: left knee pain as well with limited ROM, strength    Assessment:   REassessment completed. REfer to goal summary for status. Patient has been meeting goals for balance, ROM, strength. Ankle is 90% better per patient. Noting slight decreased balance in SLS compared to right. Also noting slight decreased strength left quads with pain reported medial joint line.  Will discharge at this time with independence in HEP. Patient has follow up with physician today. GOALS:  Patient Goal: To be able to return to work without left ankle pain, swelling, and improved walking tolerance. Also to have less left knee pain. GOAL MET Patient has returned to work and swelling at left ankle is less. Notes with walking >18 -20 minutes some discomfort. Her working area at [employer] she has long distances to walk. Short Term Goals: 4 weeks  1. Patient to report of decreased pain levels left lateral ankle with standing, walking, and AROM  from 7/10 to 3/10   GOAL MET for ankle and knee . Ankle pain 2/10, knee pain 3/10   2. Patient to demonstrate increased left ankle ROM in dorsiflexion from -10 degrees from neutral dorsiflexion to 10 degrees past neutral dorsiflexion in order to have improved ankle motion and progression with ex to further ROM ex at left ankle  GOAL MET ankle ROM left lower extremity WNLS ankle df now 10 degrees past neutral, ankle inversion /eversion WNLS. Kathy Aviles Knee ROM LLE: WNLS: 0 degrees to 128-130 degrees WNLS  4. Patient to be able to narrow stance with eyes open with equal wt shift through right/left LEs. Tandem stance for 45 seconds r/l without sway when able to be   further assessed. GOAL MET tandem stance r/l, l/r WNLS >45 seconds, SLS right 30 seconds, left 17 seconds. Strength WNLS left ankle, left knee extension MMT 4/5 with tenderness reported left medial joint line. and medial to patellar tendon region. Long Term Goals: 8 weeks   1. FAAM 21/84 to 40/84  GOAL MET FAAM 71/84     2. Independent in HEP with decreased pain, improved ROM, strength with progressing improvement with functional mobility with walking,standing, transfers. GOAL MET Patient independent with HEP to follow for knee and ankle      Patient Education:   [x]  HEP/Education Completed: use orange band for 4 way ankle, sidestep ex with band around thighs. ASO not donned this session.         Damian Wood Access Code:  []  No new Education completed  [x]  Reviewed Prior HEP      [x]  Patient verbalized and/or demonstrated understanding of education provided. []  Patient unable to verbalize and/or demonstrate understanding of education provided. Will continue education. []  Barriers to learning:     PLAN:  Treatment Recommendations: Strengthening, Range of Motion, Balance Training, Functional Mobility Training, Gait Training, Stair Training, Neuromuscular Re-education, Manual Therapy - Soft Tissue Mobilization, Pain Management, Home Exercise Program, Patient Education, Integrative Dry Needling, Aquatics and Modalities    []  Plan of care initiated. Plan to see patient 2 times per week for 8 weeks to address the treatment planned outlined above. []  Continue with current plan of care  []  Modify plan of care as follows: Follow up with physician for evaluation of left knee and possible further consult with ortho on left ankle sprain.    []  Hold pending physician visit  [x]  Discharge    Time In 0819   Time Out 0850   Timed Code Minutes: 23   Total Treatment Time: 31     Electronically Signed by: Raquel Grajeda PT

## 2021-12-06 NOTE — TELEPHONE ENCOUNTER
Pt is doing better and is going back to work tonight. She needed the letter for her to be off work written for her to go back today as even though she was off the weekend, they still want it to day today. Letter fixed.

## 2021-12-22 ENCOUNTER — TELEPHONE (OUTPATIENT)
Dept: FAMILY MEDICINE CLINIC | Age: 46
End: 2021-12-22

## 2021-12-27 ENCOUNTER — HOSPITAL ENCOUNTER (OUTPATIENT)
Age: 46
Setting detail: SPECIMEN
Discharge: HOME | End: 2021-12-27
Payer: COMMERCIAL

## 2021-12-27 ENCOUNTER — TELEMEDICINE (OUTPATIENT)
Dept: FAMILY MEDICINE CLINIC | Age: 46
End: 2021-12-27
Payer: COMMERCIAL

## 2021-12-27 DIAGNOSIS — J06.9 UPPER RESPIRATORY TRACT INFECTION, UNSPECIFIED TYPE: ICD-10-CM

## 2021-12-27 DIAGNOSIS — R51.9 ACUTE NONINTRACTABLE HEADACHE, UNSPECIFIED HEADACHE TYPE: ICD-10-CM

## 2021-12-27 DIAGNOSIS — R51.9 ACUTE NONINTRACTABLE HEADACHE, UNSPECIFIED HEADACHE TYPE: Primary | ICD-10-CM

## 2021-12-27 PROCEDURE — 87636 SARSCOV2 & INF A&B AMP PRB: CPT

## 2021-12-27 PROCEDURE — 99213 OFFICE O/P EST LOW 20 MIN: CPT | Performed by: STUDENT IN AN ORGANIZED HEALTH CARE EDUCATION/TRAINING PROGRAM

## 2021-12-27 NOTE — PROGRESS NOTES
2021    TELEHEALTH EVALUATION -- Audio/Visual (During RVAYQ-75 public health emergency)    HPI:    Kosta Mariano (:  1975) has requested an audio/video evaluation for the following concern(s):    Patient is a 41yo female who presents via virtual visit for evaluation of facial pressure and headaches. States that this has been going on for the past several days. Denies any fevers. States that she is supposed to be scheduled for a flight tomorrow and is unsure if she should go on the flight given her symptoms. She has tried tylenol with mild relief of her headache. Review of Systems   Constitutional: Negative for chills, fatigue and fever. HENT: Positive for congestion and sinus pressure. Negative for ear pain, postnasal drip and trouble swallowing. Eyes: Negative for pain and visual disturbance. Respiratory: Negative for cough and shortness of breath. Cardiovascular: Negative for chest pain and palpitations. Gastrointestinal: Negative for abdominal pain, blood in stool, constipation, diarrhea, nausea and vomiting. Genitourinary: Negative for dysuria and urgency. Skin: Negative for rash and wound. Neurological: Positive for headaches. Negative for dizziness. Psychiatric/Behavioral: Negative for dysphoric mood. The patient is not nervous/anxious. Prior to Visit Medications    Medication Sig Taking? Authorizing Provider   amLODIPine (NORVASC) 2.5 MG tablet TAKE 1 TABLET BY MOUTH EVERY DAY  Kathy Mansfield DO   cetirizine (ZYRTEC) 10 MG tablet Take 10 mg by mouth daily  Historical Provider, MD       Social History     Tobacco Use    Smoking status: Never Smoker    Smokeless tobacco: Never Used   Vaping Use    Vaping Use: Never used   Substance Use Topics    Alcohol use: No    Drug use:  No            PHYSICAL EXAMINATION:    Constitutional: [x] Appears well-developed and well-nourished [x] No apparent distress      [] Abnormal-   Mental status  [x] Alert and awake  [x] Oriented to person/place/time [x]Able to follow commands      Eyes:  EOM    [x]  Normal  [] Abnormal-  Sclera  [x]  Normal  [] Abnormal -         Discharge [x]  None visible  [] Abnormal -    HENT:   [x] Normocephalic, atraumatic. [] Abnormal   [x] Mouth/Throat: Mucous membranes are moist.     External Ears [x] Normal  [] Abnormal-     Neck: [x] No visualized mass     Pulmonary/Chest: [x] Respiratory effort normal.  [x] No visualized signs of difficulty breathing or respiratory distress        [] Abnormal-      Musculoskeletal:   [x] Normal gait with no signs of ataxia         [x] Normal range of motion of neck        [] Abnormal-       Neurological:        [x] No Facial Asymmetry (Cranial nerve 7 motor function) (limited exam to video visit)          [x] No gaze palsy        [] Abnormal-         Skin:        [x] No significant exanthematous lesions or discoloration noted on facial skin         [] Abnormal-            Psychiatric:       [x] Normal Affect [x] No Hallucinations        [] Abnormal-     Other pertinent observable physical exam findings-     ASSESSMENT/PLAN:  1. Acute nonintractable headache, unspecified headache type  Trial OTC  Tylenol or ibuprofen. Recommend ibuprofen given only mild relief with tylenol. 2. Upper respiratory tract infection, unspecified type  Swab for influenza and covid  Quarantine until results are back. Follow up PRN      No follow-ups on file. Alphonso Bush, was evaluated through a synchronous (real-time) audio-video encounter. The patient (or guardian if applicable) is aware that this is a billable service. Verbal consent to proceed has been obtained within the past 12 months. The visit was conducted pursuant to the emergency declaration under the [address] [place] [address], [address] [place] [address] authority and the [employer] General Act.   Patient identification was verified, and a caregiver was present when appropriate. The patient was located in a state where the provider was credentialed to provide care. Total time spent on this encounter: Not billed by time    --Morgan Geller DO on 1/12/2022 at 5:59 PM    An electronic signature was used to authenticate this note.

## 2021-12-28 ENCOUNTER — PATIENT MESSAGE (OUTPATIENT)
Dept: FAMILY MEDICINE CLINIC | Age: 46
End: 2021-12-28

## 2021-12-28 LAB
INFLUENZA A: NOT DETECTED
INFLUENZA B: NOT DETECTED
SARS-COV-2 RNA, RT PCR: NOT DETECTED

## 2021-12-28 NOTE — TELEPHONE ENCOUNTER
From: Donald Short  To: Haile Daly DO  Sent: 12/28/2021 11:13 AM EST  Subject: Question regarding Headaches     Hello. Glad to know Im Covid free but I am still experiencing these headaches and face pressure. I did take Motrin but it still did not take the headache away completely. Is there anyway I can get an antibiotic? Also can I bring in paperwork to get filled out because I was supposed to take a flight out of the country this morning and I didnt make it because for one my results were not back and for two Im still not over this headache. Please give me a call brigette Bartlett

## 2021-12-29 ENCOUNTER — TELEPHONE (OUTPATIENT)
Dept: FAMILY MEDICINE CLINIC | Age: 46
End: 2021-12-29

## 2021-12-29 NOTE — TELEPHONE ENCOUNTER
Patient notified and verbalized understanding. Patient requesting test results along with letter from provider for airline to get her money back for missed flight due to this illness and testing. Please advise?

## 2021-12-30 RX ORDER — AMOXICILLIN 875 MG/1
875 TABLET, COATED ORAL 2 TIMES DAILY
Qty: 20 TABLET | Refills: 0 | Status: SHIPPED | OUTPATIENT
Start: 2021-12-30 | End: 2022-01-09

## 2022-01-12 ASSESSMENT — ENCOUNTER SYMPTOMS
SHORTNESS OF BREATH: 0
NAUSEA: 0
SINUS PRESSURE: 1
COUGH: 0
TROUBLE SWALLOWING: 0
EYE PAIN: 0
DIARRHEA: 0
VOMITING: 0
BLOOD IN STOOL: 0
ABDOMINAL PAIN: 0
CONSTIPATION: 0

## 2022-04-19 RX ORDER — AMLODIPINE BESYLATE 2.5 MG/1
TABLET ORAL
Qty: 90 TABLET | Refills: 3 | Status: SHIPPED | OUTPATIENT
Start: 2022-04-19 | End: 2022-10-18 | Stop reason: SDUPTHER

## 2022-05-20 ENCOUNTER — TELEPHONE (OUTPATIENT)
Dept: FAMILY MEDICINE CLINIC | Age: 47
End: 2022-05-20

## 2022-05-20 NOTE — TELEPHONE ENCOUNTER
Received a call after hours from [facility] from [employer] requesting patient records, sent by email 4/19/2022.  Following up on request.     Work Order Reference #: [id number]    [phone]    Electronically signed by Samir Pham MD on 5/20/2022 at 12:11 PM

## 2022-10-18 ENCOUNTER — OFFICE VISIT (OUTPATIENT)
Dept: FAMILY MEDICINE CLINIC | Age: 47
End: 2022-10-18
Payer: COMMERCIAL

## 2022-10-18 VITALS
OXYGEN SATURATION: 98 % | BODY MASS INDEX: 27.06 KG/M2 | HEIGHT: 70 IN | RESPIRATION RATE: 16 BRPM | DIASTOLIC BLOOD PRESSURE: 80 MMHG | SYSTOLIC BLOOD PRESSURE: 110 MMHG | HEART RATE: 88 BPM | WEIGHT: 189 LBS

## 2022-10-18 DIAGNOSIS — I10 ESSENTIAL HYPERTENSION: Primary | ICD-10-CM

## 2022-10-18 DIAGNOSIS — J32.9 CHRONIC SINUSITIS, UNSPECIFIED LOCATION: ICD-10-CM

## 2022-10-18 DIAGNOSIS — Z86.79: ICD-10-CM

## 2022-10-18 DIAGNOSIS — R73.03 PREDIABETES: ICD-10-CM

## 2022-10-18 DIAGNOSIS — J30.2 SEASONAL ALLERGIES: ICD-10-CM

## 2022-10-18 PROBLEM — S93.412A SPRAIN OF CALCANEOFIBULAR LIGAMENT OF LEFT ANKLE: Status: RESOLVED | Noted: 2021-09-23 | Resolved: 2022-10-18

## 2022-10-18 PROBLEM — E78.5 HYPERLIPIDEMIA: Status: ACTIVE | Noted: 2019-02-07

## 2022-10-18 PROCEDURE — 99214 OFFICE O/P EST MOD 30 MIN: CPT | Performed by: STUDENT IN AN ORGANIZED HEALTH CARE EDUCATION/TRAINING PROGRAM

## 2022-10-18 RX ORDER — PNV NO.95/FERROUS FUM/FOLIC AC 28MG-0.8MG
TABLET ORAL DAILY
COMMUNITY

## 2022-10-18 RX ORDER — AMLODIPINE BESYLATE 2.5 MG/1
TABLET ORAL
Qty: 90 TABLET | Refills: 1 | Status: SHIPPED | OUTPATIENT
Start: 2022-10-18

## 2022-10-18 SDOH — HEALTH STABILITY: PHYSICAL HEALTH: ON AVERAGE, HOW MANY DAYS PER WEEK DO YOU ENGAGE IN MODERATE TO STRENUOUS EXERCISE (LIKE A BRISK WALK)?: 0 DAYS

## 2022-10-18 SDOH — HEALTH STABILITY: PHYSICAL HEALTH: ON AVERAGE, HOW MANY MINUTES DO YOU ENGAGE IN EXERCISE AT THIS LEVEL?: 0 MIN

## 2022-10-18 ASSESSMENT — SOCIAL DETERMINANTS OF HEALTH (SDOH)

## 2022-10-18 ASSESSMENT — ENCOUNTER SYMPTOMS
NAUSEA: 0
ABDOMINAL PAIN: 0
SHORTNESS OF BREATH: 0
DIARRHEA: 0
VOMITING: 0
BLOOD IN STOOL: 0
SINUS PRESSURE: 1
SORE THROAT: 0
RHINORRHEA: 0
COUGH: 0

## 2022-10-18 ASSESSMENT — PATIENT HEALTH QUESTIONNAIRE - PHQ9
SUM OF ALL RESPONSES TO PHQ QUESTIONS 1-9: 0
SUM OF ALL RESPONSES TO PHQ9 QUESTIONS 1 & 2: 0
2. FEELING DOWN, DEPRESSED OR HOPELESS: 0
SUM OF ALL RESPONSES TO PHQ QUESTIONS 1-9: 0
SUM OF ALL RESPONSES TO PHQ QUESTIONS 1-9: 0
1. LITTLE INTEREST OR PLEASURE IN DOING THINGS: 0
SUM OF ALL RESPONSES TO PHQ QUESTIONS 1-9: 0

## 2022-10-18 NOTE — PROGRESS NOTES
[facility] MEDICINE  [address] [place] [id number]  Dept: [phone]  Dept Fax: [phone]  Loc: [phone]    Michelle Dockery is a 41 y.o. female who presents today for her medical conditions/complaints as noted below. Chief Complaint   Patient presents with    Establish Care     Has cardio appointment on 11/1/22[facility] - abnormal EKG     Otalgia     Right ear x 1 week- bit better     Sinusitis     Getting worse as time goes on  - possible ENT referral        HPI:     Patient is here in the office today to establish care. Previous PCP: Janene Fulton  Specialists: cardiology - will be seeing at [facility] for abnormal EKG    Past medical history: hypertension, allergies    Family History:  - Father: diabetes, CAD s/p CABG (late 63's), esophageal cancer  - Mother: hypertension    Social history:   - Tobacco: denies  - Alcohol: denies  - Marijuana: denies  - Other drugs: denies  - Employment: works 3rd shift at [employer]: lives at home by herself    Preventative care:   Hepatitis C screen: completed - negative  Depression screen: completed today  Colorectal cancer screening: would like to discuss at next visit  Mammogram: 9/2022 - normal    Hypertension:  Patient states that home BP runs 140/90 at times - uses wrist cuff at home. She is taking amlodipine 2.5 mg daily without issues. Denies missed medication doses or adverse effects. Denies chest pain, shortness of breath, or dizziness. Seasonal allergies:  Patient reports a long history of seasonal allergies and sinus issues. Has had increased pressure in her nose with some headaches over the last few days. Blows her nose in the morning with some mucous production, but does not feel like an infection to her at this time. Has also had some right ear pain x 1 week, but this is improving. Takes zyrtec as needed but makes her drowsy.  Did not get relief from Claritin in the past. Flonase gives her headaches so she cannot take this. Patient is requesting to see ENT for evaluation. Patient notes that she was supposed to be getting a liposuction procedure in [place] later this month - had pre-op eval with her OBGYN (CMP, CBC normal, Hemoglobin A1c 5.7%, negative HIV and Hep C screening) but her EKG was abnormal. She has an appt with cardiology on 11/1/2022 for evaluation. Would like me to sign her pre-op form today to state that she IS NOT cleared for surgery due to abnormal EKG. Past Medical History:   Diagnosis Date    Allergic rhinitis     Hypertension       Past Surgical History:   Procedure Laterality Date    APPENDECTOMY  2001    Dr. Mariely Love Right 04/2014    HYSTERECTOMY (CERVIX STATUS UNKNOWN)  2012    LIPOMA RESECTION Left 9/18/13    POSTERIOR THORACIC MASS-Dr. Go Walter    MYOMECTOMY  2000, 2002, 2004, 2006    OVARY REMOVAL  2001    right-Dr. Twan Cameron     SMALL INTESTINE SURGERY      intestine stuck to abdominal wall Dr. Alejandra Bryant 12/2015 and 07/2017       Family History   Problem Relation Age of Onset    High Blood Pressure Mother     High Blood Pressure Father     Diabetes Father     Heart Surgery Father     Esophageal Cancer Father        Social History     Tobacco Use    Smoking status: Never    Smokeless tobacco: Never   Substance Use Topics    Alcohol use: No      Current Outpatient Medications   Medication Sig Dispense Refill    Ferrous Sulfate (IRON) 325 (65 Fe) MG TABS Take by mouth daily      amLODIPine (NORVASC) 2.5 MG tablet TAKE 1 TABLET BY MOUTH EVERY DAY 90 tablet 1    cetirizine (ZYRTEC) 10 MG tablet Take 10 mg by mouth daily as needed       No current facility-administered medications for this visit.      Allergies   Allergen Reactions    Adhesive Tape Other (See Comments)     Blisters    Morphine Itching and Nausea And Vomiting     Headache    Sulfa Antibiotics Rash       Health Maintenance   Topic Date Due    Hepatitis C screen Never done    COVID-19 Vaccine (2 - Booster for Paulette series) 05/26/2021    Colorectal Cancer Screen  Never done    Flu vaccine (1) 08/01/2022    Depression Screen  10/18/2023    Lipids  04/05/2026    DTaP/Tdap/Td vaccine (2 - Td or Tdap) 08/08/2026    HIV screen  Completed    Hepatitis A vaccine  Aged Out    Hib vaccine  Aged Out    Meningococcal (ACWY) vaccine  Aged Out    Pneumococcal 0-64 years Vaccine  Aged Out       Subjective:      Review of Systems   Constitutional:  Negative for chills and fever. HENT:  Positive for congestion, ear pain (right ar x 1 week, improving) and sinus pressure. Negative for rhinorrhea and sore throat. Respiratory:  Negative for cough and shortness of breath. Cardiovascular:  Negative for chest pain. Gastrointestinal:  Negative for abdominal pain, blood in stool, diarrhea, nausea and vomiting. Allergic/Immunologic: Positive for environmental allergies. Neurological:  Positive for headaches. Psychiatric/Behavioral:  Negative for dysphoric mood. Objective:     Physical Exam  Vitals and nursing note reviewed. Constitutional:       General: She is not in acute distress. Appearance: Normal appearance. She is well-developed and overweight. She is not ill-appearing or toxic-appearing. HENT:      Head: Normocephalic and atraumatic. Right Ear: Tympanic membrane, ear canal and external ear normal.      Left Ear: Tympanic membrane, ear canal and external ear normal.      Nose: Nose normal.      Mouth/Throat:      Lips: Pink. Mouth: Mucous membranes are moist.      Pharynx: Oropharynx is clear. Uvula midline. Eyes:      General: Lids are normal.      Conjunctiva/sclera: Conjunctivae normal.      Pupils: Pupils are equal, round, and reactive to light. Neck:      Thyroid: No thyromegaly. Cardiovascular:      Rate and Rhythm: Normal rate and regular rhythm. Heart sounds: Normal heart sounds. No murmur heard.   Pulmonary:      Effort: Pulmonary effort is normal.      Breath sounds: Normal breath sounds and air entry. No wheezing, rhonchi or rales. Musculoskeletal:      Cervical back: Neck supple. Lymphadenopathy:      Cervical: No cervical adenopathy. Skin:     General: Skin is warm and dry. Neurological:      General: No focal deficit present. Mental Status: She is alert and oriented to person, place, and time. Gait: Gait is intact. Psychiatric:         Mood and Affect: Mood and affect normal.         Behavior: Behavior is cooperative. /80 (Site: Left Upper Arm, Position: Sitting)   Pulse 88   Resp 16   Ht 5' 10\" (1.778 m)   Wt 189 lb (85.7 kg)   SpO2 98%   BMI 27.12 kg/m²     Assessment/Plan:   Peter French was seen today for establish care, otalgia and sinusitis. Diagnoses and all orders for this visit:    Essential hypertension  Comments:  Established, stable. Home BP readings a bit higher (140/90) though may be inaccurate due to wrist cuff. /80 today. Continue amlodipine 2.5 mg daily. Contact office with any persistently elevated BP readings >140/90. Recent CMP and CBC normal - will be scanned into chart. Orders:  -     amLODIPine (NORVASC) 2.5 MG tablet; TAKE 1 TABLET BY MOUTH EVERY DAY    Prediabetes   Recent hemoglobin A1c 5.7%. Plan to continue healthy diet changes and exercise as able. Plan to recheck hemoglobin A1c again in 6 months. Chronic sinusitis, unspecified location  Comments:  Chronic sinus infections with underlying allergic rhinitis, uncontrolled. Plan to treat seasonal allergies with Zyrtec as needed. Reports no benefit from Claritin and adverse effect from Flonase (headaches). Requesting referral to ENT - placed today. Orders:  -     Huey Johnson MD, Otolaryngology, [facility]    Seasonal allergies  Comments:  Chronic, uncontrolled. Currently taking Zyrtec as needed (makes her drowsy). Reports no benefit from Claritin and adverse effect from Flonase/nasal sprays (headaches). Requesting referral to ENT - placed today. Orders:  -     Josh Busby MD, Otolaryngology, [facility]    History of abnormal electrocardiography  Comments:  History of abnormal EKG - planning to see cardiology on 11/1/2022. Patient will sign medical release so we can get records. Patient's pre-op form completed in office today to state that patient is NOT CLEARED for liposuction pending evaluation from cardiology.     Return in about 1 month (around 11/18/2022) for yearly preventative exam.    Electronically signed by Redd García DO on 10/18/2022 at 2:45 PM

## 2022-11-02 ENCOUNTER — OFFICE VISIT (OUTPATIENT)
Dept: FAMILY MEDICINE CLINIC | Age: 47
End: 2022-11-02
Payer: COMMERCIAL

## 2022-11-02 VITALS
HEART RATE: 85 BPM | SYSTOLIC BLOOD PRESSURE: 128 MMHG | DIASTOLIC BLOOD PRESSURE: 86 MMHG | BODY MASS INDEX: 27.57 KG/M2 | OXYGEN SATURATION: 98 % | WEIGHT: 192.6 LBS | HEIGHT: 70 IN | TEMPERATURE: 98.5 F | RESPIRATION RATE: 14 BRPM

## 2022-11-02 DIAGNOSIS — R73.03 PREDIABETES: ICD-10-CM

## 2022-11-02 DIAGNOSIS — Z12.11 COLON CANCER SCREENING: ICD-10-CM

## 2022-11-02 DIAGNOSIS — J30.2 SEASONAL ALLERGIES: ICD-10-CM

## 2022-11-02 DIAGNOSIS — Z00.00 ENCOUNTER FOR WELLNESS EXAMINATION IN ADULT: Primary | ICD-10-CM

## 2022-11-02 DIAGNOSIS — I10 ESSENTIAL HYPERTENSION: ICD-10-CM

## 2022-11-02 DIAGNOSIS — R94.31 ABNORMAL EKG: ICD-10-CM

## 2022-11-02 PROBLEM — M17.9 OSTEOARTHRITIS OF KNEE: Status: ACTIVE | Noted: 2022-11-02

## 2022-11-02 PROCEDURE — 99396 PREV VISIT EST AGE 40-64: CPT | Performed by: STUDENT IN AN ORGANIZED HEALTH CARE EDUCATION/TRAINING PROGRAM

## 2022-11-02 PROCEDURE — 3074F SYST BP LT 130 MM HG: CPT | Performed by: STUDENT IN AN ORGANIZED HEALTH CARE EDUCATION/TRAINING PROGRAM

## 2022-11-02 PROCEDURE — 3078F DIAST BP <80 MM HG: CPT | Performed by: STUDENT IN AN ORGANIZED HEALTH CARE EDUCATION/TRAINING PROGRAM

## 2022-11-02 ASSESSMENT — ENCOUNTER SYMPTOMS
SHORTNESS OF BREATH: 0
ANAL BLEEDING: 0
BLOOD IN STOOL: 0
ABDOMINAL PAIN: 0
DIARRHEA: 0
COUGH: 0
VOMITING: 0
CONSTIPATION: 0

## 2022-11-02 NOTE — PROGRESS NOTES
[address] [address]  Dept: [phone]  Dept Fax: [phone]  Loc: [phone]    Gianluac Sullivan is a 41 y.o. female who presents today for her medical conditions/complaints as noted below. Chief Complaint   Patient presents with    Annual Exam       HPI:     Patient presents to the office for her yearly wellness exam.    She states that she has been doing well overall since last visit. Hypertension:  Patient states that home BP runs normally and better with arm cuff. She is taking amlodipine 2.5 mg daily without issues. Denies missed medication doses or adverse effects. Prediabetes:  Recent hemoglobin A1c was 5.7%. Not on medication - wanting to lose weight. Plans to start the 28-day diet plan-which involves cutting out sugar and fried foods, adding protein and vegetables to meals. States that she has lost some weight with this in the past.  She is also planning to add in some exercise as well. She does drink some soda occasionally if she has pizza or other fast food. Seasonal allergies:  Planning to see ENT for evaluation (appt scheduled for 1/11/2023) - has failed zyrtec (drowsiness), Claritin (drowsiness), Allegra (drowsiness), Flonase/nasal sprays (cannot tolerate). Has not tried Xyzal but would like to hold off at this time. Patient has had an abnormal EKG during recent pre-op for liposuction/tummy tuck surgery that was scheduled in [place] - followed up with cardiology yesterday. States that cardiologist scheduled a stress test and echocardiogram for this Friday. She is planning to have records released to our office. Patient does have a strong family history of cardiac disease.      Preventative care:  Colon cancer screening: due; would like referral today  Flu vaccine: declines  Mammogram: 9/2022 - normal  Pap exam: 9/2022 - normal (OBGYN in [place])    Reports that she had chickenpox as a kid and plans to get the shingles vaccine when she is able to at age 50. No history of shingles rash. Past Medical History:   Diagnosis Date    Allergic rhinitis     Hypertension       Past Surgical History:   Procedure Laterality Date    APPENDECTOMY  2001    Dr. Arturo Gómez Right 04/2014    HYSTERECTOMY (CERVIX STATUS UNKNOWN)  2012    LIPOMA RESECTION Left 9/18/13    POSTERIOR THORACIC MASS-Dr. Manju Serrato    MYOMECTOMY  2000, 2002, 2004, 2006    OVARY REMOVAL  2001    right-Dr. Farhan Zavala     SMALL INTESTINE SURGERY      intestine stuck to abdominal wall Dr. Branden Mitchell 12/2015 and 07/2017       Family History   Problem Relation Age of Onset    High Blood Pressure Mother     High Blood Pressure Father     Diabetes Father     Heart Surgery Father     Esophageal Cancer Father        Social History     Tobacco Use    Smoking status: Never    Smokeless tobacco: Never   Substance Use Topics    Alcohol use: No      Current Outpatient Medications   Medication Sig Dispense Refill    Multiple Vitamin (MULTIVITAMIN ADULT PO) Take by mouth      Ferrous Sulfate (IRON) 325 (65 Fe) MG TABS Take by mouth daily      amLODIPine (NORVASC) 2.5 MG tablet TAKE 1 TABLET BY MOUTH EVERY DAY 90 tablet 1    cetirizine (ZYRTEC) 10 MG tablet Take 10 mg by mouth daily as needed       No current facility-administered medications for this visit.      Allergies   Allergen Reactions    Adhesive Tape Other (See Comments)     Blisters    Morphine Itching and Nausea And Vomiting     Headache    Sulfa Antibiotics Rash       Health Maintenance   Topic Date Due    A1C test (Diabetic or Prediabetic)  11/01/2018    COVID-19 Vaccine (2 - Booster for Paulette series) 05/26/2021    Colorectal Cancer Screen  Never done    Flu vaccine (1) 11/02/2023 (Originally 8/1/2022)    Depression Screen  10/18/2023    Lipids  04/05/2026    DTaP/Tdap/Td vaccine (2 - Td or Tdap) 08/08/2026    HIV screen  Completed Hepatitis A vaccine  Aged Out    Hib vaccine  Aged Out    Meningococcal (ACWY) vaccine  Aged Out    Pneumococcal 0-64 years Vaccine  Aged Out    Hepatitis C screen  Discontinued       Subjective:      Review of Systems   Constitutional:  Negative for chills and fever. HENT:  Positive for congestion (chronic nasal congestion due to allergies). Respiratory:  Negative for cough and shortness of breath. Cardiovascular:  Negative for chest pain and leg swelling. Gastrointestinal:  Negative for abdominal pain, anal bleeding, blood in stool, constipation, diarrhea and vomiting. Skin:  Negative for rash. Allergic/Immunologic: Positive for environmental allergies. Neurological:  Negative for dizziness and light-headedness. Objective:     Physical Exam  Vitals and nursing note reviewed. Constitutional:       General: She is not in acute distress. Appearance: Normal appearance. She is well-developed and overweight. She is not ill-appearing. HENT:      Head: Normocephalic and atraumatic. Right Ear: Tympanic membrane, ear canal and external ear normal.      Left Ear: Tympanic membrane, ear canal and external ear normal.      Mouth/Throat:      Lips: Pink. Mouth: Mucous membranes are moist.      Pharynx: Oropharynx is clear. Uvula midline. Eyes:      General: Lids are normal.      Conjunctiva/sclera: Conjunctivae normal.      Pupils: Pupils are equal, round, and reactive to light. Neck:      Thyroid: No thyromegaly. Cardiovascular:      Rate and Rhythm: Normal rate and regular rhythm. Heart sounds: Normal heart sounds. No murmur heard. Pulmonary:      Effort: Pulmonary effort is normal.      Breath sounds: Normal breath sounds and air entry. No wheezing, rhonchi or rales. Musculoskeletal:      Cervical back: Neck supple. Lymphadenopathy:      Cervical: No cervical adenopathy. Skin:     General: Skin is warm and dry. Neurological:      General: No focal deficit present. Mental Status: She is alert and oriented to person, place, and time. Psychiatric:         Mood and Affect: Mood and affect normal.         Behavior: Behavior is cooperative. /86   Pulse 85   Temp 98.5 °F (36.9 °C) (Oral)   Resp 14   Ht 5' 10\" (1.778 m)   Wt 192 lb 9.6 oz (87.4 kg)   SpO2 98%   BMI 27.64 kg/m²     Assessment/Plan:   Jaspal Webster was seen today for annual exam.    Diagnoses and all orders for this visit:    Encounter for wellness examination in adult  Comments:  Well-appearing 22-year-old female. Due for colorectal screening-referral placed today. Had A1c test completed from outpatient lab (5.7%)-we will get records. Declines vaccines today. Essential hypertension  Comments:  Chronic, stable. Continue amlodipine as prescribed. Screening labs completed recently for preop exam-completed through outside lab, so patient will bring a copy of lab work results to our office to be scanned into chart. Prediabetes  Comments:  Established, stable. Recent hemoglobin A1c 5.7%. Patient will bring a copy of lab work to office to be scanned into chart. Continue to work on healthy diet and exercise as able for weight management. Plan to recheck A1c in 6-12 months. Seasonal allergies  Comments:  Chronic, uncontrolled. Has failed multiple antihistamines and nasal sprays due to side effects. Can consider trial of Xyzal as she has not tried this in the past.  She will call and let our office know if she decides to try medication. Patient does have an appointment with ENT scheduled on 1/11/2023. Abnormal EKG  Comments:  History of abnormal EKG - saw cardiology on 11/1/2022 at [facility]. Scheduled for treadmill stress test and echocardiogram this Friday, 11/4/2022. Patient will sign medical release so we can get records. Colon cancer screening  Comments:  Due for colon cancer screening-referral placed to gastroenterology for screening colonoscopy.   Orders:  -     Leann Hdez Santosh Mccarty MD, Gastroenterology, [facility]      Return in about 1 year (around 11/2/2023) for wellness exam.    Electronically signed by Marco Libman, DO on 11/2/2022 at 12:15 PM

## 2022-12-08 ENCOUNTER — TELEMEDICINE (OUTPATIENT)
Dept: FAMILY MEDICINE CLINIC | Age: 47
End: 2022-12-08
Payer: COMMERCIAL

## 2022-12-08 DIAGNOSIS — B96.89 ACUTE BACTERIAL RHINOSINUSITIS: Primary | ICD-10-CM

## 2022-12-08 DIAGNOSIS — J01.90 ACUTE BACTERIAL RHINOSINUSITIS: Primary | ICD-10-CM

## 2022-12-08 PROCEDURE — 99213 OFFICE O/P EST LOW 20 MIN: CPT | Performed by: STUDENT IN AN ORGANIZED HEALTH CARE EDUCATION/TRAINING PROGRAM

## 2022-12-08 RX ORDER — AMOXICILLIN AND CLAVULANATE POTASSIUM 875; 125 MG/1; MG/1
1 TABLET, FILM COATED ORAL 2 TIMES DAILY
Qty: 14 TABLET | Refills: 0 | Status: SHIPPED | OUTPATIENT
Start: 2022-12-08 | End: 2022-12-15

## 2022-12-08 ASSESSMENT — ENCOUNTER SYMPTOMS
SHORTNESS OF BREATH: 0
SORE THROAT: 0
DIARRHEA: 0
NAUSEA: 0
WHEEZING: 0
COUGH: 1
VOMITING: 0
SINUS PAIN: 1
SINUS PRESSURE: 1
RHINORRHEA: 1
ABDOMINAL PAIN: 0

## 2022-12-08 NOTE — PROGRESS NOTES
Michelle Dockery (:  1975) is a Established patient, here for evaluation of the following:    Assessment & Plan   Below is the assessment and plan developed based on review of pertinent history, physical exam, labs, studies, and medications. 1. Acute bacterial rhinosinusitis  -     amoxicillin-clavulanate (AUGMENTIN) 875-125 MG per tablet; Take 1 tablet by mouth 2 times daily for 7 days, Disp-14 tablet, R-0Normal    51-year-old female presenting via video visit for sinus symptoms over the last week. Patient is nontoxic-appearing on video. Plan to treat for bacterial rhinosinusitis with Augmentin x7 days. Advised to continue supportive care with rest, hydration, Tylenol Motrin as needed for fever or discomfort, and over-the-counter cold medicine as needed for symptomatic management. Advised to monitor symptoms closely and return for in person evaluation if symptoms worsen or persist.    Return if symptoms worsen or fail to improve. Subjective   Patient presents via video visit today with concerns of URI symptoms. She states that she has had a lot of nasal drainage over the last week, which has worsened and become more of a sinus pain and pressure. She has a lot of sinus congestion with headaches in the front as well. Does complain of a dry cough and some mild right ear pain but does not feel that she has a sore throat, shortness of breath, wheezing, or chest pain. No GI symptoms. States that she felt warm but did not check her temperature at home. She has been around other people who have been sick recently with sinus symptoms. She has been taking Ilene[place] cold and cough without much relief. Review of Systems   Constitutional:  Negative for chills and fever. HENT:  Positive for congestion, ear pain (right), rhinorrhea, sinus pressure and sinus pain. Negative for ear discharge and sore throat. Respiratory:  Positive for cough (mild, dry).  Negative for shortness of breath and wheezing. Cardiovascular:  Negative for chest pain. Gastrointestinal:  Negative for abdominal pain, diarrhea, nausea and vomiting. Neurological:  Positive for headaches. Objective   Patient-Reported Vitals  No data recorded       [INSTRUCTIONS:  \"[x]\" Indicates a positive item  \"[]\" Indicates a negative item  -- DELETE ALL ITEMS NOT EXAMINED]    Constitutional: [x] Appears well-developed and well-nourished [x] No apparent distress      [] Abnormal -     Mental status: [x] Alert and awake  [x] Oriented to person/place/time [x] Able to follow commands    [] Abnormal -     Eyes:   EOM    [x]  Normal    [] Abnormal -   Sclera  [x]  Normal    [] Abnormal -          Discharge [x]  None visible   [] Abnormal -     HENT: [x] Normocephalic, atraumatic  [] Abnormal -   [x] Mouth/Throat: Mucous membranes are moist    External Ears [x] Normal  [] Abnormal -    Pulmonary/Chest: [x] Respiratory effort normal   [x] No visualized signs of difficulty breathing or respiratory distress        [] Abnormal -     Neurological:        [x] No Facial Asymmetry (Cranial nerve 7 motor function) (limited exam due to video visit)          [x] No gaze palsy        [] Abnormal -          Skin:        [x] No significant exanthematous lesions or discoloration noted on facial skin         [] Abnormal -            Psychiatric:       [x] Normal Affect [] Abnormal -     Other pertinent observable physical exam findings:- appears to have nasal congestion on exam         [facility], was evaluated through a synchronous (real-time) audio-video encounter. The patient (or guardian if applicable) is aware that this is a billable service, which includes applicable co-pays. This Virtual Visit was conducted with patient's (and/or legal guardian's) consent.  The visit was conducted pursuant to the emergency declaration under the [facility] [address] waiver authority and the [employer] and [employer] Supplemental Appropriations Act. Patient identification was verified, and a caregiver was present when appropriate. The patient was located at Home: [phone]. Provider was located at [facility] [address] [facility],  [address] [facility].         --Shivam Card, DO

## 2023-02-06 ENCOUNTER — TELEPHONE (OUTPATIENT)
Dept: FAMILY MEDICINE CLINIC | Age: 48
End: 2023-02-06

## 2023-02-06 NOTE — TELEPHONE ENCOUNTER
PROVIDER FEEDBACK LOOP NO SHOW     Patient:Delphine Ventura  : 1975  Referring Provider: Chucho Etienne  Referral Type:  Eval and Treat     Procedures:  [id number] - AMB REFERRAL TO ENT  Date Service Ordered 10/18/2022        Jonas Medeiros did not show for their scheduled test ordered by your office. Please call your patient to explain significance of ordered test and direct patient to call Central Scheduling to schedule test at their earliest convenience. Please complete one of the following actions from Quick Actions buttons:     Route to Provider:  Route message to ordering provider to seek next steps in care plan. Telephone Encounter:  Telephone encounter will open. Call patient to explain significance of ordered test and direct patient to call Central Scheduling to schedule test then Document details of call. Open Referral:  Review referral notes or details if needed. Close Referral:  Referral will open. Document in Notes section of referral why the referral is being closed. Examples of referral closure:  Patient had test done outside of [facility] (list location), Patient refuses test, Patient no longer having symptoms, Unable to reach patient. Only close the referral if you are sure the test will not proceed.      Thank you     Central Scheduling

## 2023-05-27 NOTE — PROGRESS NOTES
[facility]  PHYSICAL THERAPY  [] EVALUATION  [x] DAILY NOTE (LAND) [] DAILY NOTE (AQUATIC ) [] PROGRESS NOTE [] DISCHARGE NOTE    [x] OUTPATIENT REHABILITATION [facility]   [] [place] [address]    [] [facility]   [] Alma Galeazzi    Date: 2021  Patient Name:  Maricruz Laird  : 1975  MRN: [id number]  CSN: [id number]    Referring Practitioner Dilshad Monroe DO   Diagnosis Sprain of calcaneofibular ligament of left ankle, initial encounter [S93.412A]    Treatment Diagnosis Significant swelling and pain left lateral ankle with difficulty walking, standing, AROM , transfers sit <>stand   Date of Evaluation 21    Additional Pertinent History HTN      Functional Outcome Measure Used FAA   Functional Outcome Score  (21)   32/84 10/21/21      Insurance: Primary: Payor: R /  /  / ,   Secondary:    Authorization Information: PRE CERTIFICATION REQUIRED: NO  INSURANCE THERAPY BENEFIT:  ALLOWED 60 VISTIS PT/OT/ST COMBINED PER CALENDAR YEAR. AQUATIC THERAPY COVERED: YES  MODALITIES COVERED:  YES, YES MASSAGE  TELEHEALTH COVERED: YES  REFERENCE NUMBER: [id number]   Visit # 12, 2/10 for progress note   Visits Allowed: 60 visits   Recertification Date:    Physician Follow-Up: Call following PT appointment for follow up. Has not followed with orthopedist.    Physician Orders:    History of Present Illness: Patient went to ER 2021 when she slipped and fell from a puddle of water. Noted swelling and bruising immediately with pain at her left ankle. Xrays have revealed no fracture or dislocation of ankle. SUBJECTIVE: Patient reports that her left knee was flared up after last treatment session. She states that she had increased left knee pain for most of the day. Currently patient rates er left knee and ankle pain a 4/10. TREATMENT   Precautions: Significant swelling and pain at left lateral ankle region.  Tenderness and X No UE   Dynamic gait: tandem walking, retro, side stepping, marches  2 laps in // bars   X Slight use of UE's                                       Reassessment     Refer to goal summary for status. Discussed level up for right shoe     Patient plans to order it. Specific Interventions Next Treatment: ice, possible Dry needling for left lateral ankle sprain, AROM left ankle beginning with ankle df/pf only. Discuss with doctor left knee consult/evaluation, left ankle orthopedist visit and further testing. Activity/Treatment Tolerance:  []  Patient tolerated treatment well  []  Patient limited by fatigue  [x]  Patient limited by pain   []  Patient limited by medical complications  [x]  Other: left knee pain as well with limited ROM, strength    Assessment: Patient continued with exercises as documented above with addition to dynamic gait exercises. She was provided with demonstration and cues on proper technique with added exercises to ensure maximal muscle activation. Cues also provided on proper technique while patient performed mini squats. Held resistance band when patient completed 3 way hip kick exercises today due to her having increased left knee pain after last treatment session. Patient denied any increase in her left knee pain while performing exercises today. She noted increased discomfort at the lateral aspect of her left ankle and foot while she performed inversion and eversion motions at her left ankle. Patient reported that her left ankle pain increased to a 6/10 at the conclusion of session. GOALS:  Patient Goal: To be able to return to work without left ankle pain, swelling, and improved walking tolerance. Also to have less left knee pain. Short Term Goals: 4 weeks  1. Patient to report of decreased pain levels left lateral ankle with standing, walking, and AROM  from 7/10 to 3/10   2.  Patient to demonstrate increased left ankle ROM in dorsiflexion from -10 degrees from neutral dorsiflexion to 10 degrees past neutral dorsiflexion in order to have improved ankle motion and progression with ex to further ROM ex at left ankle  4. Patient to be able to narrow stance with eyes open with equal wt shift through right/left LEs. Tandem stance for 45 seconds r/l without sway when able to be further assessed. Long Term Goals: 8 weeks   1. FAAM 21/84 to 40/84  2. Independent in HEP with decreased pain, improved ROM, strength with progressing improvement with functional mobility with walking,standing, transfers. Patient Education:   [x]  HEP/Education Completed: Added dynamic gait exercises as documented above. Educated on continuing to ice her knee and ankle at home.  [email or web address] Access Code:  []  No new Education completed  [x]  Reviewed Prior HEP      [x]  Patient verbalized and/or demonstrated understanding of education provided. []  Patient unable to verbalize and/or demonstrate understanding of education provided. Will continue education. []  Barriers to learning:     PLAN:  Treatment Recommendations: Strengthening, Range of Motion, Balance Training, Functional Mobility Training, Gait Training, Stair Training, Neuromuscular Re-education, Manual Therapy - Soft Tissue Mobilization, Pain Management, Home Exercise Program, Patient Education, Integrative Dry Needling, Aquatics and Modalities    []  Plan of care initiated. Plan to see patient 2 times per week for 8 weeks to address the treatment planned outlined above. [x]  Continue with current plan of care  []  Modify plan of care as follows: Follow up with physician for evaluation of left knee and possible further consult with ortho on left ankle sprain.    []  Hold pending physician visit  []  Discharge    Time In 0804   Time Out 0844   Timed Code Minutes:  40 min   Total Treatment Time: 40  min     Electronically Signed by: Brendan Neumann PTA hide

## 2023-06-07 ENCOUNTER — OFFICE VISIT (OUTPATIENT)
Dept: ENT CLINIC | Age: 48
End: 2023-06-07
Payer: COMMERCIAL

## 2023-06-07 VITALS
OXYGEN SATURATION: 96 % | HEART RATE: 79 BPM | RESPIRATION RATE: 16 BRPM | HEIGHT: 70 IN | DIASTOLIC BLOOD PRESSURE: 76 MMHG | SYSTOLIC BLOOD PRESSURE: 120 MMHG | WEIGHT: 184.2 LBS | TEMPERATURE: 97.3 F | BODY MASS INDEX: 26.37 KG/M2

## 2023-06-07 DIAGNOSIS — R44.8 FACIAL PRESSURE: ICD-10-CM

## 2023-06-07 DIAGNOSIS — R51.9 RHINOGENIC HEADACHE: Primary | ICD-10-CM

## 2023-06-07 DIAGNOSIS — J01.01 RECURRENT MAXILLARY SINUSITIS: ICD-10-CM

## 2023-06-07 PROCEDURE — 99204 OFFICE O/P NEW MOD 45 MIN: CPT | Performed by: PHYSICIAN ASSISTANT

## 2023-06-07 PROCEDURE — 3078F DIAST BP <80 MM HG: CPT | Performed by: PHYSICIAN ASSISTANT

## 2023-06-07 PROCEDURE — 3074F SYST BP LT 130 MM HG: CPT | Performed by: PHYSICIAN ASSISTANT

## 2023-06-07 RX ORDER — FLUCONAZOLE 150 MG/1
150 TABLET ORAL ONCE
Qty: 1 TABLET | Refills: 1 | Status: SHIPPED | OUTPATIENT
Start: 2023-06-07 | End: 2023-06-07

## 2023-06-07 RX ORDER — AMOXICILLIN AND CLAVULANATE POTASSIUM 875; 125 MG/1; MG/1
1 TABLET, FILM COATED ORAL 2 TIMES DAILY
Qty: 28 TABLET | Refills: 0 | Status: SHIPPED | OUTPATIENT
Start: 2023-06-07 | End: 2023-06-21

## 2023-06-07 NOTE — PROGRESS NOTES
[address] EAR, NOSE AND THROAT  [facility]  Dept: [phone]  Dept Fax: [phone]  Loc: [phone]    Zac Herrera is a 57 y.o. female who was referred by Tamia Ariza DO for:  Chief Complaint   Patient presents with    New Patient     Patient is here for Chronic sinusitis, unspecified location, Seasonal allergies, Referred by Cornel Colbert DO**RS from [address] [place] [address] had emergency   . HPI:     Zac Herrera presents today for evaluation of pressure in her nose/sinuses. She reports that she has been dealt with intermittent sinus infections, but in December had the worst sinus infection of her life. She was treated with Augmentin which seemed to help some of her symptoms, but she has been dealing with recurrent facial pressure through the nose and extends into the cheeks and around the eyes. She reports that she frequently gets headaches associated with the pressure. She has been treated with Augmentin in December and October 2022 for sinus infections which seem to help her symptoms, but was a slow response to treatment. She finds that taking Zyrtec and a nap seems to improve her facial pressure/headaches, but they recur a few days later. She reports this happens a few times per week. She does report a history of environmental allergies and frequently blows out clear mucus from her nose first thing in the morning. They are typically worse in September/October. She has tried intranasal steroids, but it excessively dried her nose and made her headaches much worse so she discontinued use. She has not tried nasal saline rinses. She used to follow-up with an allergist and was on injections about 10 years ago, but these did not seem to help much so she stopped them. She believes that she snores on a routine basis, but is uncertain. She works third shift at [employer] so her sleep schedule is typically out of whack.

## 2023-07-12 ENCOUNTER — HOSPITAL ENCOUNTER (OUTPATIENT)
Dept: CT IMAGING | Age: 48
Discharge: HOME | End: 2023-07-12
Payer: COMMERCIAL

## 2023-07-12 DIAGNOSIS — R51.9 RHINOGENIC HEADACHE: ICD-10-CM

## 2023-07-12 DIAGNOSIS — J01.01 RECURRENT MAXILLARY SINUSITIS: ICD-10-CM

## 2023-07-12 DIAGNOSIS — R44.8 FACIAL PRESSURE: ICD-10-CM

## 2023-07-12 PROCEDURE — 70486 CT MAXILLOFACIAL W/O DYE: CPT

## 2023-11-24 ASSESSMENT — PATIENT HEALTH QUESTIONNAIRE - PHQ9
1. LITTLE INTEREST OR PLEASURE IN DOING THINGS: NOT AT ALL
SUM OF ALL RESPONSES TO PHQ QUESTIONS 1-9: 0
SUM OF ALL RESPONSES TO PHQ9 QUESTIONS 1 & 2: 0
SUM OF ALL RESPONSES TO PHQ QUESTIONS 1-9: 0
SUM OF ALL RESPONSES TO PHQ QUESTIONS 1-9: 0
1. LITTLE INTEREST OR PLEASURE IN DOING THINGS: 0
2. FEELING DOWN, DEPRESSED OR HOPELESS: 0
SUM OF ALL RESPONSES TO PHQ9 QUESTIONS 1 & 2: 0
2. FEELING DOWN, DEPRESSED OR HOPELESS: NOT AT ALL
SUM OF ALL RESPONSES TO PHQ QUESTIONS 1-9: 0

## 2023-11-26 NOTE — PROGRESS NOTES
[address] [address]  Dept: [phone]  Dept Fax: [phone]  Loc: [phone]    Flores Regalado is a 57 y.o. female who presents today for her medical conditions/complaints as noted below. Chief Complaint   Patient presents with    Annual Exam    Sinus Problem     Sx for 3 weeks- runny nose        HPI:     Patient presents to the office today for her annual wellness exam.    She states that she has been doing well overall. Back to work after having a year off due to chronic elbow issues. Follows with orthopedic surgery in [place]. Hypertension:  Patient is taking amlodipine 2.5 mg daily without issues. /74 in the office today. Prediabetes:  History of prediabetes, last hemoglobin A1c 5.7% in 2022. Fasting glucose in 9/2023 was 96. Seasonal allergies:  Patient is taking Zyrtec daily, which helps most of the time. She has been dealing with increased sinus symptoms over the last 3 weeks. Has felt a lot of nasal congestion/drainage, yellow phlegm, ear pressure, and pressure in her sinuses/face. Has a history of chronic sinusitis for which she follows with ENT. States that they were recommending sinus surgery, but she has to find a time when she is able to take off work to have this completed.     Preventative care:  Hemoglobin A1c: due; ordered today  Influenza vaccine: declines today        Past Medical History:   Diagnosis Date    Allergic rhinitis     Hypertension       Past Surgical History:   Procedure Laterality Date    APPENDECTOMY  2001    Dr. Ghislaine Puentes Right 04/2014    HYSTERECTOMY (CERVIX STATUS UNKNOWN)  2012    LIPOMA RESECTION Left 9/18/13    POSTERIOR THORACIC MASS-Dr. Estefani Neumann    MYOMECTOMY  2000, 2002, 2004, 2006    OVARY REMOVAL  2001    right-Dr. Frank Allison     SMALL INTESTINE SURGERY      intestine stuck to abdominal wall Dr. Radha Anthony

## 2023-11-27 ENCOUNTER — OFFICE VISIT (OUTPATIENT)
Dept: FAMILY MEDICINE CLINIC | Age: 48
End: 2023-11-27
Payer: COMMERCIAL

## 2023-11-27 VITALS
HEART RATE: 70 BPM | SYSTOLIC BLOOD PRESSURE: 110 MMHG | DIASTOLIC BLOOD PRESSURE: 74 MMHG | RESPIRATION RATE: 16 BRPM | BODY MASS INDEX: 27.8 KG/M2 | HEIGHT: 70 IN | TEMPERATURE: 98 F | OXYGEN SATURATION: 98 % | WEIGHT: 194.2 LBS

## 2023-11-27 DIAGNOSIS — R73.03 PREDIABETES: ICD-10-CM

## 2023-11-27 DIAGNOSIS — Z00.00 ENCOUNTER FOR WELLNESS EXAMINATION IN ADULT: Primary | ICD-10-CM

## 2023-11-27 DIAGNOSIS — I10 ESSENTIAL HYPERTENSION: ICD-10-CM

## 2023-11-27 DIAGNOSIS — J01.01 ACUTE RECURRENT MAXILLARY SINUSITIS: ICD-10-CM

## 2023-11-27 DIAGNOSIS — J30.2 SEASONAL ALLERGIES: ICD-10-CM

## 2023-11-27 LAB
ALBUMIN SERPL BCG-MCNC: 4.7 G/DL (ref 3.5–5.1)
ALP SERPL-CCNC: 89 U/L (ref 38–126)
ALT SERPL W/O P-5'-P-CCNC: 22 U/L (ref 11–66)
ANION GAP SERPL CALC-SCNC: 8 MEQ/L (ref 8–16)
AST SERPL-CCNC: 22 U/L (ref 5–40)
BILIRUB SERPL-MCNC: 0.6 MG/DL (ref 0.3–1.2)
BUN SERPL-MCNC: 9 MG/DL (ref 7–22)
CALCIUM SERPL-MCNC: 9.1 MG/DL (ref 8.5–10.5)
CHLORIDE SERPL-SCNC: 105 MEQ/L (ref 98–111)
CO2 SERPL-SCNC: 28 MEQ/L (ref 23–33)
CREAT SERPL-MCNC: 0.8 MG/DL (ref 0.4–1.2)
DEPRECATED MEAN GLUCOSE BLD GHB EST-ACNC: 99 MG/DL (ref 70–126)
GFR SERPL CREATININE-BSD FRML MDRD: > 60 ML/MIN/1.73M2
GLUCOSE SERPL-MCNC: 97 MG/DL (ref 70–108)
HBA1C MFR BLD HPLC: 5.3 % (ref 4.4–6.4)
POTASSIUM SERPL-SCNC: 4.4 MEQ/L (ref 3.5–5.2)
PROT SERPL-MCNC: 7.1 G/DL (ref 6.1–8)
SODIUM SERPL-SCNC: 141 MEQ/L (ref 135–145)

## 2023-11-27 PROCEDURE — 99396 PREV VISIT EST AGE 40-64: CPT | Performed by: STUDENT IN AN ORGANIZED HEALTH CARE EDUCATION/TRAINING PROGRAM

## 2023-11-27 PROCEDURE — 3074F SYST BP LT 130 MM HG: CPT | Performed by: STUDENT IN AN ORGANIZED HEALTH CARE EDUCATION/TRAINING PROGRAM

## 2023-11-27 PROCEDURE — 36415 COLL VENOUS BLD VENIPUNCTURE: CPT | Performed by: STUDENT IN AN ORGANIZED HEALTH CARE EDUCATION/TRAINING PROGRAM

## 2023-11-27 PROCEDURE — 3078F DIAST BP <80 MM HG: CPT | Performed by: STUDENT IN AN ORGANIZED HEALTH CARE EDUCATION/TRAINING PROGRAM

## 2023-11-27 RX ORDER — AMOXICILLIN AND CLAVULANATE POTASSIUM 875; 125 MG/1; MG/1
1 TABLET, FILM COATED ORAL 2 TIMES DAILY
Qty: 14 TABLET | Refills: 0 | Status: SHIPPED | OUTPATIENT
Start: 2023-11-27 | End: 2023-12-04

## 2023-11-27 RX ORDER — AMLODIPINE BESYLATE 2.5 MG/1
TABLET ORAL
Qty: 90 TABLET | Refills: 3 | Status: SHIPPED | OUTPATIENT
Start: 2023-11-27

## 2023-11-27 ASSESSMENT — ENCOUNTER SYMPTOMS
NAUSEA: 0
VOMITING: 0
WHEEZING: 0
SINUS PRESSURE: 1
SORE THROAT: 0
RHINORRHEA: 1
SHORTNESS OF BREATH: 0
COUGH: 0
ABDOMINAL PAIN: 0

## 2023-12-14 ENCOUNTER — OFFICE VISIT (OUTPATIENT)
Dept: ENT CLINIC | Age: 48
End: 2023-12-14
Payer: COMMERCIAL

## 2023-12-14 VITALS
SYSTOLIC BLOOD PRESSURE: 122 MMHG | DIASTOLIC BLOOD PRESSURE: 76 MMHG | HEIGHT: 70 IN | TEMPERATURE: 97.5 F | WEIGHT: 195.1 LBS | OXYGEN SATURATION: 98 % | RESPIRATION RATE: 18 BRPM | BODY MASS INDEX: 27.93 KG/M2 | HEART RATE: 96 BPM

## 2023-12-14 DIAGNOSIS — R51.9 RHINOGENIC HEADACHE: Primary | ICD-10-CM

## 2023-12-14 DIAGNOSIS — R44.8 FACIAL PRESSURE: ICD-10-CM

## 2023-12-14 DIAGNOSIS — J01.01 RECURRENT MAXILLARY SINUSITIS: ICD-10-CM

## 2023-12-14 PROCEDURE — 99213 OFFICE O/P EST LOW 20 MIN: CPT | Performed by: OTOLARYNGOLOGY

## 2023-12-14 PROCEDURE — 3078F DIAST BP <80 MM HG: CPT | Performed by: OTOLARYNGOLOGY

## 2023-12-14 PROCEDURE — 3074F SYST BP LT 130 MM HG: CPT | Performed by: OTOLARYNGOLOGY

## 2023-12-14 NOTE — PROGRESS NOTES
[facility]  [address] [place] [address]  Dept: [phone]  Dept Fax: [phone]  Loc: [phone]    Sean Eng is a 57 y.o. female who was referred by No ref. provider found for:  Chief Complaint   Patient presents with    Follow-up     Patient is here for CT Facial 07/13/23. Patient still c/o sinus pressure in ears, nose and eyes. She has post nasal drainage. Laura Fears HPI:     Sean Eng is a 57 y.o. female who presents today for follow-up after vigorous therapy for sinusitis and allergies to review the resulting CT scan. Her symptoms have improved dramatically and she is very pleased. She breathes well through her nose. Headaches have stopped. She has been taking the Zyrtec consistently and took all of the Augmentin. Laura Fears History: Allergies   Allergen Reactions    Adhesive Tape Other (See Comments)     Blisters    Morphine Itching and Nausea And Vomiting     Headache    Sulfa Antibiotics Rash     Current Outpatient Medications   Medication Sig Dispense Refill    Cholecalciferol (VITAMIN D3) 125 MCG (5000 UT) TABS Take 1 tablet by mouth      amLODIPine (NORVASC) 2.5 MG tablet TAKE 1 TABLET BY MOUTH EVERY DAY 90 tablet 3    Multiple Vitamin (MULTIVITAMIN ADULT PO) Take by mouth      cetirizine (ZYRTEC) 10 MG tablet Take 1 tablet by mouth daily as needed       No current facility-administered medications for this visit.      Past Medical History:   Diagnosis Date    Allergic rhinitis     Hypertension       Past Surgical History:   Procedure Laterality Date    APPENDECTOMY  2001    Dr. Shavon Lew Right 04/2014    HYSTERECTOMY (CERVIX STATUS UNKNOWN)  2012    LIPOMA RESECTION Left 9/18/13    POSTERIOR THORACIC MASS-Dr. Arsh Bullard    MYOMECTOMY  2000, 2002, 2004, 2006    OVARY REMOVAL  2001    right-Dr. Cathy Quiles     SMALL INTESTINE SURGERY      intestine stuck to abdominal wall

## 2023-12-14 NOTE — PATIENT INSTRUCTIONS
Assumed cares: [id number]    VS: VSS on 3L NC  GI/: Voiding adequately. 3x BM on shift.   Nutrition: Pt concern about food w/ gastric bypass. Food found in pt's pillow after she told the nurse that she had eaten half of the food. Reinforced that calorie counting is for her benefit to assess nutrition status w/o [place] tube feeds.   Pain: Pt is currently taking oxycodone q4hrs for 6/10 pain in lower back. Heating pad offered w/ positive pain relief.  Pt's mother addressed concern about pt's past addictive behaviors w/ drinking alcohol and is concerned that frequent use of opioids could lead to an addiction upon discharge.    IRRIGATE    XYZAL

## 2024-06-20 ENCOUNTER — OFFICE VISIT (OUTPATIENT)
Dept: FAMILY MEDICINE CLINIC | Age: 49
End: 2024-06-20
Payer: COMMERCIAL

## 2024-06-20 VITALS
DIASTOLIC BLOOD PRESSURE: 84 MMHG | HEART RATE: 76 BPM | TEMPERATURE: 98 F | BODY MASS INDEX: 27.75 KG/M2 | RESPIRATION RATE: 18 BRPM | SYSTOLIC BLOOD PRESSURE: 114 MMHG | WEIGHT: 193.4 LBS | OXYGEN SATURATION: 97 %

## 2024-06-20 DIAGNOSIS — R09.82 POSTNASAL DRIP: Primary | ICD-10-CM

## 2024-06-20 PROCEDURE — 3079F DIAST BP 80-89 MM HG: CPT | Performed by: STUDENT IN AN ORGANIZED HEALTH CARE EDUCATION/TRAINING PROGRAM

## 2024-06-20 PROCEDURE — 99213 OFFICE O/P EST LOW 20 MIN: CPT | Performed by: STUDENT IN AN ORGANIZED HEALTH CARE EDUCATION/TRAINING PROGRAM

## 2024-06-20 PROCEDURE — 3074F SYST BP LT 130 MM HG: CPT | Performed by: STUDENT IN AN ORGANIZED HEALTH CARE EDUCATION/TRAINING PROGRAM

## 2024-06-20 SDOH — ECONOMIC STABILITY: FOOD INSECURITY: WITHIN THE PAST 12 MONTHS, THE FOOD YOU BOUGHT JUST DIDN'T LAST AND YOU DIDN'T HAVE MONEY TO GET MORE.: PATIENT DECLINED

## 2024-06-20 SDOH — ECONOMIC STABILITY: FOOD INSECURITY: WITHIN THE PAST 12 MONTHS, YOU WORRIED THAT YOUR FOOD WOULD RUN OUT BEFORE YOU GOT MONEY TO BUY MORE.: PATIENT DECLINED

## 2024-06-20 SDOH — ECONOMIC STABILITY: HOUSING INSECURITY
IN THE LAST 12 MONTHS, WAS THERE A TIME WHEN YOU DID NOT HAVE A STEADY PLACE TO SLEEP OR SLEPT IN A SHELTER (INCLUDING NOW)?: PATIENT DECLINED

## 2024-06-20 SDOH — ECONOMIC STABILITY: INCOME INSECURITY: HOW HARD IS IT FOR YOU TO PAY FOR THE VERY BASICS LIKE FOOD, HOUSING, MEDICAL CARE, AND HEATING?: PATIENT DECLINED

## 2024-06-20 ASSESSMENT — PATIENT HEALTH QUESTIONNAIRE - PHQ9
SUM OF ALL RESPONSES TO PHQ QUESTIONS 1-9: 0
1. LITTLE INTEREST OR PLEASURE IN DOING THINGS: NOT AT ALL
2. FEELING DOWN, DEPRESSED OR HOPELESS: NOT AT ALL
SUM OF ALL RESPONSES TO PHQ9 QUESTIONS 1 & 2: 0
SUM OF ALL RESPONSES TO PHQ QUESTIONS 1-9: 0

## 2024-06-20 ASSESSMENT — ENCOUNTER SYMPTOMS
DIARRHEA: 0
TROUBLE SWALLOWING: 1
CONSTIPATION: 0
RHINORRHEA: 0
COUGH: 0
NAUSEA: 0
VOMITING: 0
SHORTNESS OF BREATH: 0

## 2024-06-20 NOTE — PROGRESS NOTES
[facility]  [facility]  [address] [facility] [address]  [phone]     Delphine Benito is a 49 y.o. female who presents today for:  Chief Complaint   Patient presents with    Oral Pain     Bumps on back of tongue can feel with swallowing       Assessment/Plan:     Delphine was seen today for oral pain.    Diagnoses and all orders for this visit:    Postnasal drip        Patient has sensation of bumps on tongue and continued need to swallow.  Recommended changing Xyzal to Zyrtec 10 mg twice daily, add Flonase 2 puffs per nostril twice a day for postnasal drip.  Otherwise recommended patient increase water intake.  Can follow-up with ENT if no resolution of symptoms.         No follow-ups on file.      Medications Prescribed:  No orders of the defined types were placed in this encounter.      Future Appointments   Date Time Provider Department Center   7/30/2024  9:30 AM [facility]       HPI:     HPI  49-year-old female past medical history significant for allergic rhinitis, HTN, HLD, prediabetes, seasonal allergies who presents as an acute care visit for oral pain.    Bumps on back of tongue she can feel with swallowing, feels like something on the back of her tongue. Feels like worse when she lays down. Not sick otherwise. Has been going on for a couple of weeks.     Takes zyzol daily.     Does think tongue is white/lighter than she should be. Did take antibiotic for vaginal infection.     Subjective:      Review of Systems   Constitutional:  Negative for fatigue and fever.   HENT:  Positive for postnasal drip and trouble swallowing (No trouble swallowing but uncomfortable sensation at the back of tongue and swallowing). Negative for rhinorrhea.    Respiratory:  Negative for cough and shortness of breath.    Cardiovascular:  Negative for chest pain and leg swelling.   Gastrointestinal:  Negative for constipation,

## 2024-06-20 NOTE — PATIENT INSTRUCTIONS
Stop xyzal for now. Take Zyrtec twice a day for two weeks. If you can, take flonase two puffs per nostril twice a day for two weeks.

## 2024-08-26 DIAGNOSIS — I10 ESSENTIAL HYPERTENSION: ICD-10-CM

## 2024-08-26 RX ORDER — AMLODIPINE BESYLATE 2.5 MG/1
TABLET ORAL
Qty: 90 TABLET | Refills: 1 | Status: SHIPPED | OUTPATIENT
Start: 2024-08-26

## 2024-08-26 NOTE — TELEPHONE ENCOUNTER
Patient's last appointment was : 6/20/2024  Patient's next appointment is : Visit date not found  Last refilled:  11/27/2023 Amlodipine 2.5 mg, 90 tabs and 3 refills.

## 2024-10-04 ENCOUNTER — OFFICE VISIT (OUTPATIENT)
Dept: FAMILY MEDICINE CLINIC | Age: 49
End: 2024-10-04
Payer: COMMERCIAL

## 2024-10-04 VITALS
TEMPERATURE: 98.7 F | BODY MASS INDEX: 28.01 KG/M2 | HEART RATE: 80 BPM | RESPIRATION RATE: 18 BRPM | SYSTOLIC BLOOD PRESSURE: 128 MMHG | OXYGEN SATURATION: 98 % | WEIGHT: 195.2 LBS | DIASTOLIC BLOOD PRESSURE: 84 MMHG

## 2024-10-04 DIAGNOSIS — H10.31 ACUTE BACTERIAL CONJUNCTIVITIS OF RIGHT EYE: ICD-10-CM

## 2024-10-04 DIAGNOSIS — J01.90 ACUTE BACTERIAL SINUSITIS: Primary | ICD-10-CM

## 2024-10-04 DIAGNOSIS — B96.89 ACUTE BACTERIAL SINUSITIS: Primary | ICD-10-CM

## 2024-10-04 DIAGNOSIS — B37.9 YEAST INFECTION: ICD-10-CM

## 2024-10-04 PROCEDURE — 99214 OFFICE O/P EST MOD 30 MIN: CPT | Performed by: STUDENT IN AN ORGANIZED HEALTH CARE EDUCATION/TRAINING PROGRAM

## 2024-10-04 PROCEDURE — 3074F SYST BP LT 130 MM HG: CPT | Performed by: STUDENT IN AN ORGANIZED HEALTH CARE EDUCATION/TRAINING PROGRAM

## 2024-10-04 PROCEDURE — 3079F DIAST BP 80-89 MM HG: CPT | Performed by: STUDENT IN AN ORGANIZED HEALTH CARE EDUCATION/TRAINING PROGRAM

## 2024-10-04 RX ORDER — POLYMYXIN B SULFATE AND TRIMETHOPRIM 1; 10000 MG/ML; [USP'U]/ML
1 SOLUTION OPHTHALMIC EVERY 4 HOURS
Qty: 3 ML | Refills: 0 | Status: SHIPPED | OUTPATIENT
Start: 2024-10-04 | End: 2024-10-14

## 2024-10-04 RX ORDER — FLUCONAZOLE 150 MG/1
150 TABLET ORAL
Qty: 2 TABLET | Refills: 0 | Status: SHIPPED | OUTPATIENT
Start: 2024-10-04 | End: 2024-10-10

## 2024-10-04 ASSESSMENT — ENCOUNTER SYMPTOMS
CONSTIPATION: 0
SORE THROAT: 1
VOICE CHANGE: 1
SINUS PRESSURE: 1
DIARRHEA: 0
SHORTNESS OF BREATH: 0
SINUS PAIN: 1
EYE DISCHARGE: 1
PHOTOPHOBIA: 0
EYE ITCHING: 0
EYE PAIN: 1
EYE REDNESS: 0
VOMITING: 0
NAUSEA: 0
COUGH: 0

## 2024-10-04 NOTE — PROGRESS NOTES
Normocephalic and atraumatic.      Right Ear: Tympanic membrane, ear canal and external ear normal.      Left Ear: Tympanic membrane, ear canal and external ear normal.      Nose:      Right Sinus: Maxillary sinus tenderness present.      Left Sinus: Maxillary sinus tenderness present.      Mouth/Throat:      Mouth: Mucous membranes are moist.      Pharynx: Posterior oropharyngeal erythema present. No oropharyngeal exudate.   Eyes:      General:         Right eye: Discharge present.   Cardiovascular:      Rate and Rhythm: Normal rate and regular rhythm.      Pulses: Normal pulses.      Heart sounds: Normal heart sounds.   Pulmonary:      Effort: Pulmonary effort is normal.      Breath sounds: Normal breath sounds.   Abdominal:      General: Abdomen is flat. Bowel sounds are normal.      Palpations: Abdomen is soft.   Neurological:      Mental Status: She is alert.           Patient given educational materials - see patient instructions.  Discussed use, benefit, and sideeffects of prescribed medications.  All patient questions answered.  Pt voiced understanding. Reviewed health maintenance.  Instructed to continue current medications, diet and exercise.  Patient agreed with treatment plan.Follow up as directed.     Electronically signed by Eneida Corbett MD on 10/4/2024 at 10:52 AM

## 2024-10-15 NOTE — PROGRESS NOTES
[facility]  [facility]  [address] [facility] [address]  Dept: [phone]  Dept Fax: [phone]  Loc: [phone]    Delphine Benito is a 49 y.o. female who presents today for her medical conditions/complaints as noted below.     Chief Complaint   Patient presents with    Pharyngitis     Pt states he was seen on 10/4 and sx are no better- right side of throat hurting and has right eye swelling     Cough     Taking ilene-seltzer OTC     Headache     Sx for 16 days        HPI:     Patient presents to the office today for concerns of persistent sore throat, right eye swelling/watering, and new-onset right ear pain.  Patient was initially seen in our office by another provider on 10/4/2024.  She complained of sore throat, yellow discharge from her right eye, and a cough at that time.  She was treated with Augmentin x 7 days for suspected sinus infection, as well as antibiotic eyedrops for conjunctivitis.  States that she completed antibiotics as prescribed and did have improvement in her symptoms, though her cough and sore throat persisted somewhat.  Over the last 3 days, her sore throat has worsened along the right side and she has also noticed right ear pain.  Over the last day or so she has noticed that her right eye is swollen again and watery, but she has not had any yellow discharge.  Feels that she may have a small \"water blister\" on her eye.  Dry cough is occasional.  She has not noticed any fever or chills.  Occasionally has some pressure in her face and headache, but denies significant congestion.  Has been taking Ilene[place] over-the-counter.      Past Medical History:   Diagnosis Date    Allergic rhinitis     Hypertension       Past Surgical History:   Procedure Laterality Date    APPENDECTOMY  2001    Dr. Gutierrez     CARPAL TUNNEL RELEASE Right 04/2014    HYSTERECTOMY (CERVIX STATUS UNKNOWN)  2012    LIPOMA RESECTION Left 9/18/13

## 2024-10-16 ENCOUNTER — OFFICE VISIT (OUTPATIENT)
Dept: FAMILY MEDICINE CLINIC | Age: 49
End: 2024-10-16
Payer: COMMERCIAL

## 2024-10-16 VITALS
HEART RATE: 100 BPM | DIASTOLIC BLOOD PRESSURE: 80 MMHG | WEIGHT: 197.4 LBS | RESPIRATION RATE: 16 BRPM | BODY MASS INDEX: 28.26 KG/M2 | TEMPERATURE: 98.7 F | SYSTOLIC BLOOD PRESSURE: 130 MMHG | OXYGEN SATURATION: 98 % | HEIGHT: 70 IN

## 2024-10-16 DIAGNOSIS — H10.31 ACUTE CONJUNCTIVITIS OF RIGHT EYE, UNSPECIFIED ACUTE CONJUNCTIVITIS TYPE: ICD-10-CM

## 2024-10-16 DIAGNOSIS — J02.9 SORE THROAT: ICD-10-CM

## 2024-10-16 DIAGNOSIS — J06.9 UPPER RESPIRATORY TRACT INFECTION, UNSPECIFIED TYPE: Primary | ICD-10-CM

## 2024-10-16 PROCEDURE — 3075F SYST BP GE 130 - 139MM HG: CPT | Performed by: STUDENT IN AN ORGANIZED HEALTH CARE EDUCATION/TRAINING PROGRAM

## 2024-10-16 PROCEDURE — 3079F DIAST BP 80-89 MM HG: CPT | Performed by: STUDENT IN AN ORGANIZED HEALTH CARE EDUCATION/TRAINING PROGRAM

## 2024-10-16 PROCEDURE — 99213 OFFICE O/P EST LOW 20 MIN: CPT | Performed by: STUDENT IN AN ORGANIZED HEALTH CARE EDUCATION/TRAINING PROGRAM

## 2024-10-16 RX ORDER — FLUCONAZOLE 150 MG/1
150 TABLET ORAL
Qty: 2 TABLET | Refills: 0 | Status: SHIPPED | OUTPATIENT
Start: 2024-10-16 | End: 2024-10-22

## 2024-10-16 RX ORDER — CEFDINIR 300 MG/1
300 CAPSULE ORAL 2 TIMES DAILY
Qty: 20 CAPSULE | Refills: 0 | Status: SHIPPED | OUTPATIENT
Start: 2024-10-16 | End: 2024-10-26

## 2024-10-16 RX ORDER — PREDNISONE 20 MG/1
40 TABLET ORAL DAILY
Qty: 10 TABLET | Refills: 0 | Status: SHIPPED | OUTPATIENT
Start: 2024-10-16 | End: 2024-10-21

## 2024-10-17 ASSESSMENT — ENCOUNTER SYMPTOMS
EYE REDNESS: 1
VOMITING: 0
SINUS PRESSURE: 1
SHORTNESS OF BREATH: 0
WHEEZING: 0
EYE DISCHARGE: 1
ABDOMINAL PAIN: 0
NAUSEA: 0
COUGH: 1
SORE THROAT: 1

## 2024-10-19 LAB — BACTERIA THROAT AEROBE CULT: NORMAL

## 2024-10-21 ENCOUNTER — PATIENT MESSAGE (OUTPATIENT)
Dept: FAMILY MEDICINE CLINIC | Age: 49
End: 2024-10-21

## 2024-10-21 ENCOUNTER — TELEPHONE (OUTPATIENT)
Dept: FAMILY MEDICINE CLINIC | Age: 49
End: 2024-10-21

## 2024-10-21 NOTE — TELEPHONE ENCOUNTER
----- Message from Dr. Pooja Beltrán, DO sent at 10/20/2024  4:03 PM EDT -----  Please let patient know that her throat culture did not grow any bacteria that would cause an infection, which is good. How is she feeling? Her sore throat may be from allergies and postnasal drainage. Let me know. Thanks    Dr. Beltrán

## 2024-10-31 NOTE — PROGRESS NOTES
[facility] PROFESSIONAL SERVS  [facility]  [address] [id number]  Dept: [phone]  Dept Fax: [phone]  Loc: [phone]    Delphine Benito is a 49 y.o. female who presents today for her medical conditions/complaints as noted below.     Chief Complaint   Patient presents with    Annual Exam    Medication Refill     BP med    Other     Sore on toe        HPI:     Patient presents to the office today for her yearly wellness exam.    She states that she started feeling better from URI symptoms last week.  Denies any current significant congestion, sore throat, ear pain.    Hypertension:  She is taking amlodipine 2.5 mg daily without issues. /74 in the office today.  She has missed doses of her BP medication in the past and states that her blood pressure still runs in normal range.    Prediabetes:  History of prediabetes; last hemoglobin A1c 5.3% in 11/2023. Not on medication for this.  Had a recent fasting glucose checked for biometric screening, which was 92.    Seasonal allergies:  Taking Zyrtec daily without issues.    Preventative care:  Vaccines: declines  Pap exam: UTD this summer; Dr. Vázquez    Patient does complain of a small sore area on her left pinky toe over the last week or so.  No known injury or trauma, but states that it almost looks like a blister.  No open area or drainage.        Past Medical History:   Diagnosis Date    Allergic rhinitis     Hypertension       Past Surgical History:   Procedure Laterality Date    APPENDECTOMY  2001    Dr. Gutierrez     CARPAL TUNNEL RELEASE Right 04/2014    HYSTERECTOMY (CERVIX STATUS UNKNOWN)  2012    LIPOMA RESECTION Left 9/18/13    POSTERIOR THORACIC MASS-Dr. Tirado    MYOMECTOMY  2000, 2002, 2004, 2006    OVARY REMOVAL  2001    right-Dr. Garcia     SMALL INTESTINE SURGERY      intestine stuck to abdominal wall Dr. Derrell Vázquez [place] 12/2015 and 07/2017       Family History   Problem Relation

## 2024-11-04 ENCOUNTER — OFFICE VISIT (OUTPATIENT)
Dept: FAMILY MEDICINE CLINIC | Age: 49
End: 2024-11-04
Payer: COMMERCIAL

## 2024-11-04 VITALS
OXYGEN SATURATION: 98 % | DIASTOLIC BLOOD PRESSURE: 74 MMHG | BODY MASS INDEX: 27.66 KG/M2 | WEIGHT: 193.2 LBS | RESPIRATION RATE: 16 BRPM | HEART RATE: 76 BPM | HEIGHT: 70 IN | SYSTOLIC BLOOD PRESSURE: 110 MMHG

## 2024-11-04 DIAGNOSIS — J30.2 SEASONAL ALLERGIES: ICD-10-CM

## 2024-11-04 DIAGNOSIS — Z00.00 ENCOUNTER FOR WELLNESS EXAMINATION IN ADULT: Primary | ICD-10-CM

## 2024-11-04 DIAGNOSIS — L97.509 SORE ON TOE (HCC): ICD-10-CM

## 2024-11-04 DIAGNOSIS — I10 ESSENTIAL HYPERTENSION: ICD-10-CM

## 2024-11-04 DIAGNOSIS — R73.03 PREDIABETES: ICD-10-CM

## 2024-11-04 DIAGNOSIS — R53.82 CHRONIC FATIGUE: ICD-10-CM

## 2024-11-04 LAB
ALBUMIN SERPL BCG-MCNC: 4.6 G/DL (ref 3.5–5.1)
ALP SERPL-CCNC: 79 U/L (ref 38–126)
ALT SERPL W/O P-5'-P-CCNC: 16 U/L (ref 11–66)
ANION GAP SERPL CALC-SCNC: 12 MEQ/L (ref 8–16)
AST SERPL-CCNC: 17 U/L (ref 5–40)
BILIRUB SERPL-MCNC: 1.5 MG/DL (ref 0.3–1.2)
BUN SERPL-MCNC: 10 MG/DL (ref 7–22)
CALCIUM SERPL-MCNC: 9.3 MG/DL (ref 8.5–10.5)
CHLORIDE SERPL-SCNC: 103 MEQ/L (ref 98–111)
CO2 SERPL-SCNC: 25 MEQ/L (ref 23–33)
CREAT SERPL-MCNC: 0.8 MG/DL (ref 0.4–1.2)
GFR SERPL CREATININE-BSD FRML MDRD: > 90 ML/MIN/1.73M2
GLUCOSE SERPL-MCNC: 80 MG/DL (ref 70–108)
POTASSIUM SERPL-SCNC: 3.8 MEQ/L (ref 3.5–5.2)
PROT SERPL-MCNC: 6.9 G/DL (ref 6.1–8)
SODIUM SERPL-SCNC: 140 MEQ/L (ref 135–145)

## 2024-11-04 PROCEDURE — 3074F SYST BP LT 130 MM HG: CPT | Performed by: STUDENT IN AN ORGANIZED HEALTH CARE EDUCATION/TRAINING PROGRAM

## 2024-11-04 PROCEDURE — 99396 PREV VISIT EST AGE 40-64: CPT | Performed by: STUDENT IN AN ORGANIZED HEALTH CARE EDUCATION/TRAINING PROGRAM

## 2024-11-04 PROCEDURE — 3078F DIAST BP <80 MM HG: CPT | Performed by: STUDENT IN AN ORGANIZED HEALTH CARE EDUCATION/TRAINING PROGRAM

## 2024-11-04 PROCEDURE — 36415 COLL VENOUS BLD VENIPUNCTURE: CPT | Performed by: STUDENT IN AN ORGANIZED HEALTH CARE EDUCATION/TRAINING PROGRAM

## 2024-11-05 LAB — TSH SERPL DL<=0.005 MIU/L-ACNC: 2.09 UIU/ML (ref 0.4–4.2)

## 2024-11-05 ASSESSMENT — ENCOUNTER SYMPTOMS
SORE THROAT: 0
COUGH: 0
ABDOMINAL PAIN: 0
SHORTNESS OF BREATH: 0
RHINORRHEA: 0
NAUSEA: 0
VOMITING: 0

## 2024-12-10 LAB
BILIRUB SERPL-MCNC: 1.3 MG/DL (ref 0.2–1)
BILIRUBIN DIRECT: 0.2 MG/DL (ref 0.1–0.2)

## 2025-02-25 ENCOUNTER — OFFICE VISIT (OUTPATIENT)
Dept: FAMILY MEDICINE CLINIC | Age: 50
End: 2025-02-25
Payer: COMMERCIAL

## 2025-02-25 VITALS
OXYGEN SATURATION: 96 % | DIASTOLIC BLOOD PRESSURE: 80 MMHG | HEART RATE: 92 BPM | RESPIRATION RATE: 16 BRPM | SYSTOLIC BLOOD PRESSURE: 132 MMHG | WEIGHT: 189 LBS | BODY MASS INDEX: 27.12 KG/M2

## 2025-02-25 DIAGNOSIS — T14.8XXA BLISTER OF SKIN: ICD-10-CM

## 2025-02-25 DIAGNOSIS — M76.61 ACHILLES TENDINITIS OF RIGHT LOWER EXTREMITY: Primary | ICD-10-CM

## 2025-02-25 DIAGNOSIS — H91.93 DECREASED HEARING OF BOTH EARS: ICD-10-CM

## 2025-02-25 PROCEDURE — 3079F DIAST BP 80-89 MM HG: CPT | Performed by: STUDENT IN AN ORGANIZED HEALTH CARE EDUCATION/TRAINING PROGRAM

## 2025-02-25 PROCEDURE — 99214 OFFICE O/P EST MOD 30 MIN: CPT | Performed by: STUDENT IN AN ORGANIZED HEALTH CARE EDUCATION/TRAINING PROGRAM

## 2025-02-25 PROCEDURE — 3075F SYST BP GE 130 - 139MM HG: CPT | Performed by: STUDENT IN AN ORGANIZED HEALTH CARE EDUCATION/TRAINING PROGRAM

## 2025-02-25 RX ORDER — CEPHALEXIN 500 MG/1
500 CAPSULE ORAL 4 TIMES DAILY
Qty: 20 CAPSULE | Refills: 0 | Status: SHIPPED | OUTPATIENT
Start: 2025-02-25 | End: 2025-03-02

## 2025-02-25 RX ORDER — PREDNISONE 20 MG/1
TABLET ORAL
Qty: 14 TABLET | Refills: 0 | Status: SHIPPED | OUTPATIENT
Start: 2025-02-25 | End: 2025-03-07

## 2025-02-25 SDOH — ECONOMIC STABILITY: FOOD INSECURITY: WITHIN THE PAST 12 MONTHS, THE FOOD YOU BOUGHT JUST DIDN'T LAST AND YOU DIDN'T HAVE MONEY TO GET MORE.: NEVER TRUE

## 2025-02-25 SDOH — ECONOMIC STABILITY: FOOD INSECURITY: WITHIN THE PAST 12 MONTHS, YOU WORRIED THAT YOUR FOOD WOULD RUN OUT BEFORE YOU GOT MONEY TO BUY MORE.: NEVER TRUE

## 2025-02-25 ASSESSMENT — PATIENT HEALTH QUESTIONNAIRE - PHQ9
SUM OF ALL RESPONSES TO PHQ9 QUESTIONS 1 & 2: 0
SUM OF ALL RESPONSES TO PHQ QUESTIONS 1-9: 0
SUM OF ALL RESPONSES TO PHQ QUESTIONS 1-9: 0
2. FEELING DOWN, DEPRESSED OR HOPELESS: NOT AT ALL
SUM OF ALL RESPONSES TO PHQ QUESTIONS 1-9: 0
1. LITTLE INTEREST OR PLEASURE IN DOING THINGS: NOT AT ALL
SUM OF ALL RESPONSES TO PHQ QUESTIONS 1-9: 0

## 2025-02-25 ASSESSMENT — ENCOUNTER SYMPTOMS
COUGH: 0
SHORTNESS OF BREATH: 0

## 2025-02-25 NOTE — PROGRESS NOTES
[facility] PROFESSIONAL SERVS  [facility]  [address] [address]  Dept: [phone]  Dept Fax: [phone]  Loc: [phone]    Delphine Benito is a 49 y.o. female who presents today for her medical conditions/complaints as noted below.     Chief Complaint   Patient presents with    Foot Pain     Back R side x couple weeks but last night it was hard to walk at work   Pinky toe L side x October     Taking OTC tylenol for pain but not helping     Hearing Loss     Both ears x years        HPI:     Patient presents to the office today to discuss multiple concerns.    States that she has had decreased hearing for many years, but has just started to notice it more frequently at work.  Reports that both ears have diminished hearing; denies worse symptoms on either side.  Denies family history of loss.  She has not had a hearing evaluation in the past.    Patient also states that she has had a blister on the inside of her left pinky toe for months.  She was seen for this previously and has been offloading it/using padding around the area without relief.  States that the blister opened up at 1 point, but never fully healed.  She has also had some redness on top of her pinky toe and it is tender to touch.    Lastly, patient states that she has had right heel pain x 1-2 weeks.  Pain was significantly worse last night while at work, which made it hard for her to walk.  Pain is located on the back of her right heel in a very specific spot.  Feels better when she wears shoes without a back (crocs specifically).  States that she has to wear steel toed boots for work, but she has been wearing the same type of shoes for years without issues.  Denies known injury or trauma.  States that the pain gradually started and cannot remember a specific time when she first noticed it.  She denies any changes to her activities/exercise.  She has taken Tylenol, but has not tried any other

## 2025-06-03 ENCOUNTER — RESULTS FOLLOW-UP (OUTPATIENT)
Dept: FAMILY MEDICINE CLINIC | Age: 50
End: 2025-06-03

## 2025-06-03 DIAGNOSIS — Z12.39 ENCOUNTER FOR BREAST CANCER SCREENING USING NON-MAMMOGRAM MODALITY: Primary | ICD-10-CM

## 2025-06-03 DIAGNOSIS — R92.333 HETEROGENEOUSLY DENSE TISSUE OF BOTH BREASTS ON MAMMOGRAPHY: ICD-10-CM

## 2025-07-09 NOTE — PROGRESS NOTES
Subjective:      Delphine Benito is a 50 y.o. female who presents to the office today for a preoperative consultation at the request of surgeon Dr. Tucker Swift ([facility] in [place]) who plans on performing revision right carpal tunnel release with right cubital tunnel release on July 22.  This consultation is requested for the specific conditions prompting preoperative evaluation (i.e. because of potential affect on operative risk): hypertension and prediabetes.  Planned anesthesia is regional (axillary)?. The patient has the following known anesthesia issues: none.  Patient has a bleeding risk of: no recent abnormal bleeding. Patient does not have objection to receiving blood products if needed.    Past medical history includes hypertension and prediabetes.     Hypertension:  Stable on Amlodipine 2.5 mg daily. /88 in the office today    Prediabetes:  Last hemoglobin A1c 5.3%. Not on medication for this.    No known history of CAD, CVA, COPD/asthma, CHF, MEREDITH, CKD, anemia, or DM on insulin.    Functional capacity: >4 METS    Denies CP, SOB, dizziness, lightheadedness, syncope.    Past Medical History:   Diagnosis Date    Allergic rhinitis     Hypertension      Patient Active Problem List    Diagnosis Date Noted    Osteoarthritis of knee 11/02/2022    Prediabetes 10/18/2022    Seasonal allergies 10/18/2022    Hyperlipidemia 02/07/2019    Essential hypertension 09/01/2021    Lipoma 08/16/2013     Past Surgical History:   Procedure Laterality Date    APPENDECTOMY  2001    Dr. Gutierrez     CARPAL TUNNEL RELEASE Right 04/2014    HYSTERECTOMY (CERVIX STATUS UNKNOWN)  2012    LIPOMA RESECTION Left 9/18/13    POSTERIOR THORACIC MASS-Dr. Tirado    MYOMECTOMY  2000, 2002, 2004, 2006    OVARY REMOVAL  2001    right-Dr. Garcia     SMALL INTESTINE SURGERY      intestine stuck to abdominal wall Dr. Derrell Bryant 12/2015 and 07/2017     Social History     Socioeconomic History    Marital status: Single

## 2025-07-10 ENCOUNTER — TELEPHONE (OUTPATIENT)
Dept: FAMILY MEDICINE CLINIC | Age: 50
End: 2025-07-10

## 2025-07-10 DIAGNOSIS — Z01.818 PRE-OP TESTING: Primary | ICD-10-CM

## 2025-07-10 NOTE — TELEPHONE ENCOUNTER
Called and spoke to patient - she informed me she has an appointment at [facility] to have EKG done.   Patient has not had labs completed, would like these ordered and would like to them done at [facility].   Patient would like call back once this has been ordered.

## 2025-07-10 NOTE — TELEPHONE ENCOUNTER
Patient has surgery scheduled at [facility] in [place] on 7/22 -- please find out if she has any pre-op labs and EKG from ortho. If not, I will order these for her to complete before her visit with me next Monday. Thanks    Dr. Beltrán

## 2025-07-12 ENCOUNTER — LAB (OUTPATIENT)
Dept: LAB | Age: 50
End: 2025-07-12

## 2025-07-12 DIAGNOSIS — Z01.818 PRE-OP TESTING: ICD-10-CM

## 2025-07-12 LAB
ANION GAP SERPL CALC-SCNC: 9 MEQ/L (ref 8–16)
BASOPHILS ABSOLUTE: 0 THOU/MM3 (ref 0–0.1)
BASOPHILS NFR BLD AUTO: 0.5 %
BUN SERPL-MCNC: 9 MG/DL (ref 8–23)
CALCIUM SERPL-MCNC: 9.1 MG/DL (ref 8.6–10)
CHLORIDE SERPL-SCNC: 104 MEQ/L (ref 98–111)
CO2 SERPL-SCNC: 26 MEQ/L (ref 22–29)
CREAT SERPL-MCNC: 0.9 MG/DL (ref 0.5–0.9)
DEPRECATED RDW RBC AUTO: 40.5 FL (ref 35–45)
EOSINOPHIL NFR BLD AUTO: 2.6 %
EOSINOPHILS ABSOLUTE: 0.2 THOU/MM3 (ref 0–0.4)
ERYTHROCYTE [DISTWIDTH] IN BLOOD BY AUTOMATED COUNT: 12.4 % (ref 11.5–14.5)
GFR SERPL CREATININE-BSD FRML MDRD: 79 ML/MIN/1.73M2
GLUCOSE SERPL-MCNC: 86 MG/DL (ref 74–109)
HCT VFR BLD AUTO: 44.2 % (ref 37–47)
HGB BLD-MCNC: 14.8 GM/DL (ref 12–16)
IMM GRANULOCYTES # BLD AUTO: 0.01 THOU/MM3 (ref 0–0.07)
IMM GRANULOCYTES NFR BLD AUTO: 0.1 %
LYMPHOCYTES ABSOLUTE: 2.3 THOU/MM3 (ref 1–4.8)
LYMPHOCYTES NFR BLD AUTO: 30.6 %
MCH RBC QN AUTO: 30 PG (ref 26–33)
MCHC RBC AUTO-ENTMCNC: 33.5 GM/DL (ref 32.2–35.5)
MCV RBC AUTO: 89.5 FL (ref 81–99)
MONOCYTES ABSOLUTE: 0.7 THOU/MM3 (ref 0.4–1.3)
MONOCYTES NFR BLD AUTO: 9.1 %
NEUTROPHILS ABSOLUTE: 4.2 THOU/MM3 (ref 1.8–7.7)
NEUTROPHILS NFR BLD AUTO: 57.1 %
NRBC BLD AUTO-RTO: 0 /100 WBC
PLATELET # BLD AUTO: 226 THOU/MM3 (ref 130–400)
PMV BLD AUTO: 12.5 FL (ref 9.4–12.4)
POTASSIUM SERPL-SCNC: 3.5 MEQ/L (ref 3.5–5.2)
RBC # BLD AUTO: 4.94 MILL/MM3 (ref 4.2–5.4)
SODIUM SERPL-SCNC: 139 MEQ/L (ref 135–145)
WBC # BLD AUTO: 7.4 THOU/MM3 (ref 4.8–10.8)

## 2025-07-14 ENCOUNTER — OFFICE VISIT (OUTPATIENT)
Dept: FAMILY MEDICINE CLINIC | Age: 50
End: 2025-07-14
Payer: COMMERCIAL

## 2025-07-14 VITALS
OXYGEN SATURATION: 98 % | SYSTOLIC BLOOD PRESSURE: 122 MMHG | HEART RATE: 80 BPM | BODY MASS INDEX: 25.17 KG/M2 | DIASTOLIC BLOOD PRESSURE: 88 MMHG | WEIGHT: 175.4 LBS | RESPIRATION RATE: 16 BRPM

## 2025-07-14 DIAGNOSIS — Z01.818 PRE-OP EVALUATION: ICD-10-CM

## 2025-07-14 DIAGNOSIS — G56.01 CARPAL TUNNEL SYNDROME ON RIGHT: Primary | ICD-10-CM

## 2025-07-14 DIAGNOSIS — R73.03 PREDIABETES: ICD-10-CM

## 2025-07-14 DIAGNOSIS — G56.21 CUBITAL TUNNEL SYNDROME ON RIGHT: ICD-10-CM

## 2025-07-14 DIAGNOSIS — I10 ESSENTIAL HYPERTENSION: ICD-10-CM

## 2025-07-14 PROCEDURE — 3074F SYST BP LT 130 MM HG: CPT | Performed by: STUDENT IN AN ORGANIZED HEALTH CARE EDUCATION/TRAINING PROGRAM

## 2025-07-14 PROCEDURE — 3079F DIAST BP 80-89 MM HG: CPT | Performed by: STUDENT IN AN ORGANIZED HEALTH CARE EDUCATION/TRAINING PROGRAM

## 2025-07-14 PROCEDURE — 99214 OFFICE O/P EST MOD 30 MIN: CPT | Performed by: STUDENT IN AN ORGANIZED HEALTH CARE EDUCATION/TRAINING PROGRAM

## 2025-07-14 ASSESSMENT — ENCOUNTER SYMPTOMS
ABDOMINAL PAIN: 0
VOMITING: 0
COUGH: 0
NAUSEA: 0
SHORTNESS OF BREATH: 0

## 2025-08-17 DIAGNOSIS — E55.9 VITAMIN D DEFICIENCY: ICD-10-CM

## 2025-08-19 RX ORDER — ERGOCALCIFEROL 1.25 MG/1
50000 CAPSULE, LIQUID FILLED ORAL WEEKLY
Qty: 12 CAPSULE | Refills: 0 | Status: SHIPPED | OUTPATIENT
Start: 2025-08-19